# Patient Record
Sex: FEMALE | Race: WHITE | Employment: OTHER | URBAN - NONMETROPOLITAN AREA
[De-identification: names, ages, dates, MRNs, and addresses within clinical notes are randomized per-mention and may not be internally consistent; named-entity substitution may affect disease eponyms.]

---

## 2021-09-27 ENCOUNTER — APPOINTMENT (OUTPATIENT)
Dept: GENERAL RADIOLOGY | Age: 61
DRG: 177 | End: 2021-09-27
Payer: MEDICAID

## 2021-09-27 ENCOUNTER — HOSPITAL ENCOUNTER (INPATIENT)
Age: 61
LOS: 4 days | Discharge: NURSING, REHAB OR LONG-TERM CARE | DRG: 177 | End: 2021-10-01
Attending: EMERGENCY MEDICINE | Admitting: INTERNAL MEDICINE
Payer: MEDICAID

## 2021-09-27 ENCOUNTER — APPOINTMENT (OUTPATIENT)
Dept: CT IMAGING | Age: 61
DRG: 177 | End: 2021-09-27
Payer: MEDICAID

## 2021-09-27 DIAGNOSIS — J96.01 ACUTE HYPOXEMIC RESPIRATORY FAILURE DUE TO COVID-19 (HCC): Primary | ICD-10-CM

## 2021-09-27 DIAGNOSIS — F03.90 DEMENTIA WITHOUT BEHAVIORAL DISTURBANCE, UNSPECIFIED DEMENTIA TYPE: ICD-10-CM

## 2021-09-27 DIAGNOSIS — G89.29 OTHER CHRONIC PAIN: ICD-10-CM

## 2021-09-27 DIAGNOSIS — U07.1 ACUTE HYPOXEMIC RESPIRATORY FAILURE DUE TO COVID-19 (HCC): Primary | ICD-10-CM

## 2021-09-27 PROBLEM — E78.5 HYPERLIPIDEMIA: Status: ACTIVE | Noted: 2021-09-27

## 2021-09-27 PROBLEM — F31.9 BIPOLAR DISORDER (HCC): Status: ACTIVE | Noted: 2021-09-27

## 2021-09-27 PROBLEM — G35 HX OF MULTIPLE SCLEROSIS (HCC): Status: ACTIVE | Noted: 2021-09-27

## 2021-09-27 PROBLEM — J12.82 PNEUMONIA DUE TO COVID-19 VIRUS: Status: ACTIVE | Noted: 2021-09-27

## 2021-09-27 PROBLEM — F41.9 ANXIETY: Status: ACTIVE | Noted: 2021-09-27

## 2021-09-27 PROBLEM — J44.9 COPD (CHRONIC OBSTRUCTIVE PULMONARY DISEASE) (HCC): Status: ACTIVE | Noted: 2021-09-27

## 2021-09-27 LAB
ALBUMIN SERPL-MCNC: 2.5 G/DL (ref 3.5–5.2)
ALP BLD-CCNC: 78 U/L (ref 35–104)
ALT SERPL-CCNC: 8 U/L (ref 5–33)
ANION GAP SERPL CALCULATED.3IONS-SCNC: 9 MMOL/L (ref 7–19)
AST SERPL-CCNC: 22 U/L (ref 5–32)
BASE EXCESS ARTERIAL: 8.8 MMOL/L (ref -2–2)
BASOPHILS ABSOLUTE: 0.1 K/UL (ref 0–0.2)
BASOPHILS RELATIVE PERCENT: 1.1 % (ref 0–1)
BILIRUB SERPL-MCNC: 0.6 MG/DL (ref 0.2–1.2)
BUN BLDV-MCNC: 16 MG/DL (ref 8–23)
C-REACTIVE PROTEIN: 4.37 MG/DL (ref 0–0.5)
CALCIUM SERPL-MCNC: 8.8 MG/DL (ref 8.8–10.2)
CARBOXYHEMOGLOBIN ARTERIAL: 1.8 % (ref 0–5)
CHLORIDE BLD-SCNC: 105 MMOL/L (ref 98–111)
CO2: 30 MMOL/L (ref 22–29)
CREAT SERPL-MCNC: 0.4 MG/DL (ref 0.5–0.9)
D DIMER: 0.27 UG/ML FEU (ref 0–0.48)
EOSINOPHILS ABSOLUTE: 0 K/UL (ref 0–0.6)
EOSINOPHILS RELATIVE PERCENT: 0 % (ref 0–5)
GFR AFRICAN AMERICAN: >59
GFR NON-AFRICAN AMERICAN: >60
GLUCOSE BLD-MCNC: 98 MG/DL (ref 74–109)
HCO3 ARTERIAL: 35 MMOL/L (ref 22–26)
HCT VFR BLD CALC: 43 % (ref 37–47)
HEMOGLOBIN, ART, EXTENDED: 12.5 G/DL (ref 12–16)
HEMOGLOBIN: 13 G/DL (ref 12–16)
IMMATURE GRANULOCYTES #: 0.2 K/UL
LYMPHOCYTES ABSOLUTE: 2 K/UL (ref 1.1–4.5)
LYMPHOCYTES RELATIVE PERCENT: 38.3 % (ref 20–40)
MAGNESIUM: 1.9 MG/DL (ref 1.6–2.4)
MCH RBC QN AUTO: 33.9 PG (ref 27–31)
MCHC RBC AUTO-ENTMCNC: 30.2 G/DL (ref 33–37)
MCV RBC AUTO: 112.3 FL (ref 81–99)
METHEMOGLOBIN ARTERIAL: 1.4 %
MONOCYTES ABSOLUTE: 0.5 K/UL (ref 0–0.9)
MONOCYTES RELATIVE PERCENT: 10.1 % (ref 0–10)
NEUTROPHILS ABSOLUTE: 2.5 K/UL (ref 1.5–7.5)
NEUTROPHILS RELATIVE PERCENT: 46.7 % (ref 50–65)
O2 CONTENT ARTERIAL: 15.5 ML/DL
O2 SAT, ARTERIAL: 88.3 %
O2 THERAPY: ABNORMAL
PCO2 ARTERIAL: 54 MMHG (ref 35–45)
PDW BLD-RTO: 13.6 % (ref 11.5–14.5)
PH ARTERIAL: 7.42 (ref 7.35–7.45)
PLATELET # BLD: 195 K/UL (ref 130–400)
PMV BLD AUTO: 10.3 FL (ref 9.4–12.3)
PO2 ARTERIAL: 55 MMHG (ref 80–100)
POTASSIUM REFLEX MAGNESIUM: 3.4 MMOL/L (ref 3.5–5)
POTASSIUM, WHOLE BLOOD: 3.6
PROCALCITONIN: 0.09 NG/ML (ref 0–0.09)
RBC # BLD: 3.83 M/UL (ref 4.2–5.4)
SARS-COV-2, NAAT: DETECTED
SODIUM BLD-SCNC: 144 MMOL/L (ref 136–145)
TOTAL PROTEIN: 6.5 G/DL (ref 6.6–8.7)
WBC # BLD: 5.3 K/UL (ref 4.8–10.8)

## 2021-09-27 PROCEDURE — 80053 COMPREHEN METABOLIC PANEL: CPT

## 2021-09-27 PROCEDURE — 3E0333Z INTRODUCTION OF ANTI-INFLAMMATORY INTO PERIPHERAL VEIN, PERCUTANEOUS APPROACH: ICD-10-PCS | Performed by: STUDENT IN AN ORGANIZED HEALTH CARE EDUCATION/TRAINING PROGRAM

## 2021-09-27 PROCEDURE — 85025 COMPLETE CBC W/AUTO DIFF WBC: CPT

## 2021-09-27 PROCEDURE — 82803 BLOOD GASES ANY COMBINATION: CPT

## 2021-09-27 PROCEDURE — 93005 ELECTROCARDIOGRAM TRACING: CPT | Performed by: EMERGENCY MEDICINE

## 2021-09-27 PROCEDURE — 2700000000 HC OXYGEN THERAPY PER DAY

## 2021-09-27 PROCEDURE — 87635 SARS-COV-2 COVID-19 AMP PRB: CPT

## 2021-09-27 PROCEDURE — 85379 FIBRIN DEGRADATION QUANT: CPT

## 2021-09-27 PROCEDURE — 1210000000 HC MED SURG R&B

## 2021-09-27 PROCEDURE — 71045 X-RAY EXAM CHEST 1 VIEW: CPT

## 2021-09-27 PROCEDURE — 36600 WITHDRAWAL OF ARTERIAL BLOOD: CPT

## 2021-09-27 PROCEDURE — 84132 ASSAY OF SERUM POTASSIUM: CPT

## 2021-09-27 PROCEDURE — 36415 COLL VENOUS BLD VENIPUNCTURE: CPT

## 2021-09-27 PROCEDURE — 71275 CT ANGIOGRAPHY CHEST: CPT

## 2021-09-27 PROCEDURE — 6360000004 HC RX CONTRAST MEDICATION: Performed by: INTERNAL MEDICINE

## 2021-09-27 PROCEDURE — 84145 PROCALCITONIN (PCT): CPT

## 2021-09-27 PROCEDURE — 86140 C-REACTIVE PROTEIN: CPT

## 2021-09-27 PROCEDURE — 2580000003 HC RX 258: Performed by: INTERNAL MEDICINE

## 2021-09-27 PROCEDURE — 83735 ASSAY OF MAGNESIUM: CPT

## 2021-09-27 PROCEDURE — 6370000000 HC RX 637 (ALT 250 FOR IP): Performed by: INTERNAL MEDICINE

## 2021-09-27 PROCEDURE — 6360000002 HC RX W HCPCS: Performed by: INTERNAL MEDICINE

## 2021-09-27 PROCEDURE — 99284 EMERGENCY DEPT VISIT MOD MDM: CPT

## 2021-09-27 RX ORDER — METHYLPHENIDATE HYDROCHLORIDE 5 MG/1
10 TABLET ORAL 2 TIMES DAILY
Status: DISCONTINUED | OUTPATIENT
Start: 2021-09-27 | End: 2021-10-01 | Stop reason: HOSPADM

## 2021-09-27 RX ORDER — TRAZODONE HYDROCHLORIDE 50 MG/1
50 TABLET ORAL NIGHTLY
Status: DISCONTINUED | OUTPATIENT
Start: 2021-09-27 | End: 2021-10-01 | Stop reason: HOSPADM

## 2021-09-27 RX ORDER — LEVOTHYROXINE SODIUM 0.07 MG/1
75 TABLET ORAL DAILY
COMMUNITY

## 2021-09-27 RX ORDER — VITAMIN B COMPLEX
6000 TABLET ORAL DAILY
Status: DISCONTINUED | OUTPATIENT
Start: 2021-09-27 | End: 2021-10-01 | Stop reason: HOSPADM

## 2021-09-27 RX ORDER — MAGNESIUM SULFATE 1 G/100ML
1000 INJECTION INTRAVENOUS PRN
Status: DISCONTINUED | OUTPATIENT
Start: 2021-09-27 | End: 2021-10-01 | Stop reason: HOSPADM

## 2021-09-27 RX ORDER — TRAZODONE HYDROCHLORIDE 50 MG/1
50 TABLET ORAL NIGHTLY
COMMUNITY

## 2021-09-27 RX ORDER — 0.9 % SODIUM CHLORIDE 0.9 %
30 INTRAVENOUS SOLUTION INTRAVENOUS PRN
Status: DISCONTINUED | OUTPATIENT
Start: 2021-09-27 | End: 2021-10-01 | Stop reason: HOSPADM

## 2021-09-27 RX ORDER — ONDANSETRON 4 MG/1
4 TABLET, ORALLY DISINTEGRATING ORAL EVERY 8 HOURS PRN
Status: DISCONTINUED | OUTPATIENT
Start: 2021-09-27 | End: 2021-10-01 | Stop reason: HOSPADM

## 2021-09-27 RX ORDER — IBUPROFEN 600 MG/1
600 TABLET ORAL EVERY 12 HOURS PRN
COMMUNITY

## 2021-09-27 RX ORDER — MECOBALAMIN 5000 MCG
5 TABLET,DISINTEGRATING ORAL NIGHTLY
Status: DISCONTINUED | OUTPATIENT
Start: 2021-09-27 | End: 2021-10-01 | Stop reason: HOSPADM

## 2021-09-27 RX ORDER — POTASSIUM CHLORIDE 20 MEQ/1
40 TABLET, EXTENDED RELEASE ORAL PRN
Status: DISCONTINUED | OUTPATIENT
Start: 2021-09-27 | End: 2021-10-01 | Stop reason: HOSPADM

## 2021-09-27 RX ORDER — SODIUM CHLORIDE 0.9 % (FLUSH) 0.9 %
5-40 SYRINGE (ML) INJECTION EVERY 12 HOURS SCHEDULED
Status: DISCONTINUED | OUTPATIENT
Start: 2021-09-27 | End: 2021-10-01 | Stop reason: HOSPADM

## 2021-09-27 RX ORDER — SODIUM CHLORIDE 0.9 % (FLUSH) 0.9 %
5-40 SYRINGE (ML) INJECTION PRN
Status: DISCONTINUED | OUTPATIENT
Start: 2021-09-27 | End: 2021-10-01 | Stop reason: HOSPADM

## 2021-09-27 RX ORDER — BUDESONIDE AND FORMOTEROL FUMARATE DIHYDRATE 160; 4.5 UG/1; UG/1
2 AEROSOL RESPIRATORY (INHALATION) 2 TIMES DAILY
Status: DISCONTINUED | OUTPATIENT
Start: 2021-09-27 | End: 2021-10-01 | Stop reason: HOSPADM

## 2021-09-27 RX ORDER — ASCORBIC ACID 500 MG
500 TABLET ORAL DAILY
Status: DISCONTINUED | OUTPATIENT
Start: 2021-09-27 | End: 2021-09-29

## 2021-09-27 RX ORDER — FLUOXETINE 10 MG/1
10 CAPSULE ORAL DAILY
Status: DISCONTINUED | OUTPATIENT
Start: 2021-09-27 | End: 2021-10-01 | Stop reason: HOSPADM

## 2021-09-27 RX ORDER — CALCIUM CARBONATE 200(500)MG
1 TABLET,CHEWABLE ORAL 3 TIMES DAILY PRN
COMMUNITY

## 2021-09-27 RX ORDER — VITAMIN B COMPLEX
2000 TABLET ORAL DAILY
Status: DISCONTINUED | OUTPATIENT
Start: 2021-10-04 | End: 2021-10-01 | Stop reason: HOSPADM

## 2021-09-27 RX ORDER — LEVOTHYROXINE SODIUM 0.07 MG/1
75 TABLET ORAL DAILY
Status: DISCONTINUED | OUTPATIENT
Start: 2021-09-27 | End: 2021-10-01 | Stop reason: HOSPADM

## 2021-09-27 RX ORDER — ACETAMINOPHEN 325 MG/1
650 TABLET ORAL EVERY 6 HOURS PRN
Status: DISCONTINUED | OUTPATIENT
Start: 2021-09-27 | End: 2021-10-01 | Stop reason: HOSPADM

## 2021-09-27 RX ORDER — HYDROCODONE BITARTRATE AND ACETAMINOPHEN 7.5; 325 MG/1; MG/1
1 TABLET ORAL EVERY 6 HOURS PRN
Status: ON HOLD | COMMUNITY
End: 2021-10-01 | Stop reason: SDUPTHER

## 2021-09-27 RX ORDER — BUSPIRONE HYDROCHLORIDE 5 MG/1
5 TABLET ORAL 2 TIMES DAILY
COMMUNITY

## 2021-09-27 RX ORDER — ACETAMINOPHEN 650 MG/1
650 SUPPOSITORY RECTAL EVERY 6 HOURS PRN
Status: DISCONTINUED | OUTPATIENT
Start: 2021-09-27 | End: 2021-10-01 | Stop reason: HOSPADM

## 2021-09-27 RX ORDER — GLATIRAMER ACETATE 20 MG/ML
20 INJECTION, SOLUTION SUBCUTANEOUS DAILY
COMMUNITY

## 2021-09-27 RX ORDER — CYCLOBENZAPRINE HCL 10 MG
10 TABLET ORAL 3 TIMES DAILY PRN
COMMUNITY

## 2021-09-27 RX ORDER — ZINC SULFATE 50(220)MG
50 CAPSULE ORAL DAILY
Status: DISCONTINUED | OUTPATIENT
Start: 2021-09-27 | End: 2021-10-01 | Stop reason: HOSPADM

## 2021-09-27 RX ORDER — DEXAMETHASONE SODIUM PHOSPHATE 10 MG/ML
6 INJECTION, SOLUTION INTRAMUSCULAR; INTRAVENOUS EVERY 24 HOURS
Status: DISCONTINUED | OUTPATIENT
Start: 2021-09-27 | End: 2021-09-29

## 2021-09-27 RX ORDER — DIVALPROEX SODIUM 250 MG/1
250 TABLET, DELAYED RELEASE ORAL DAILY
Status: ON HOLD | COMMUNITY
End: 2021-10-01 | Stop reason: SDUPTHER

## 2021-09-27 RX ORDER — ONDANSETRON 2 MG/ML
4 INJECTION INTRAMUSCULAR; INTRAVENOUS EVERY 6 HOURS PRN
Status: DISCONTINUED | OUTPATIENT
Start: 2021-09-27 | End: 2021-10-01 | Stop reason: HOSPADM

## 2021-09-27 RX ORDER — MENTHOL AND ZINC OXIDE .44; 20.625 G/100G; G/100G
OINTMENT TOPICAL DAILY
COMMUNITY
End: 2021-10-04

## 2021-09-27 RX ORDER — GLATIRAMER ACETATE 20 MG/ML
20 INJECTION, SOLUTION SUBCUTANEOUS DAILY
Status: DISCONTINUED | OUTPATIENT
Start: 2021-09-28 | End: 2021-10-01 | Stop reason: HOSPADM

## 2021-09-27 RX ORDER — FAMOTIDINE 20 MG/1
20 TABLET, FILM COATED ORAL DAILY
COMMUNITY

## 2021-09-27 RX ORDER — DIPHENHYDRAMINE HCL 25 MG
25 CAPSULE ORAL EVERY 6 HOURS PRN
COMMUNITY

## 2021-09-27 RX ORDER — DOCUSATE SODIUM 100 MG/1
100 CAPSULE, LIQUID FILLED ORAL DAILY
COMMUNITY

## 2021-09-27 RX ORDER — DIPHENHYDRAMINE HCL 25 MG
25 TABLET ORAL EVERY 6 HOURS PRN
Status: DISCONTINUED | OUTPATIENT
Start: 2021-09-27 | End: 2021-10-01 | Stop reason: HOSPADM

## 2021-09-27 RX ORDER — CYCLOBENZAPRINE HCL 10 MG
10 TABLET ORAL 3 TIMES DAILY PRN
Status: DISCONTINUED | OUTPATIENT
Start: 2021-09-27 | End: 2021-10-01 | Stop reason: HOSPADM

## 2021-09-27 RX ORDER — GABAPENTIN 300 MG/1
300 CAPSULE ORAL 3 TIMES DAILY
Status: DISCONTINUED | OUTPATIENT
Start: 2021-09-27 | End: 2021-10-01 | Stop reason: HOSPADM

## 2021-09-27 RX ORDER — FLUOXETINE 10 MG/1
40 CAPSULE ORAL DAILY
Status: ON HOLD | COMMUNITY
End: 2021-10-01 | Stop reason: SDUPTHER

## 2021-09-27 RX ORDER — SODIUM CHLORIDE 9 MG/ML
25 INJECTION, SOLUTION INTRAVENOUS PRN
Status: DISCONTINUED | OUTPATIENT
Start: 2021-09-27 | End: 2021-10-01 | Stop reason: HOSPADM

## 2021-09-27 RX ORDER — OYSTER SHELL CALCIUM WITH VITAMIN D 500; 200 MG/1; [IU]/1
1 TABLET, FILM COATED ORAL 2 TIMES DAILY
COMMUNITY

## 2021-09-27 RX ORDER — POLYETHYLENE GLYCOL 3350 17 G/17G
17 POWDER, FOR SOLUTION ORAL DAILY PRN
Status: DISCONTINUED | OUTPATIENT
Start: 2021-09-27 | End: 2021-10-01 | Stop reason: HOSPADM

## 2021-09-27 RX ORDER — GUAIFENESIN/DEXTROMETHORPHAN 100-10MG/5
5 SYRUP ORAL EVERY 4 HOURS PRN
Status: DISCONTINUED | OUTPATIENT
Start: 2021-09-27 | End: 2021-10-01 | Stop reason: HOSPADM

## 2021-09-27 RX ORDER — RISPERIDONE 0.5 MG/1
0.5 TABLET, FILM COATED ORAL 2 TIMES DAILY
Status: DISCONTINUED | OUTPATIENT
Start: 2021-09-27 | End: 2021-10-01 | Stop reason: HOSPADM

## 2021-09-27 RX ORDER — METHYLPHENIDATE HYDROCHLORIDE 10 MG/1
10 TABLET ORAL 2 TIMES DAILY
Status: ON HOLD | COMMUNITY
End: 2021-10-10 | Stop reason: SDUPTHER

## 2021-09-27 RX ORDER — DIVALPROEX SODIUM 500 MG/1
250 TABLET, DELAYED RELEASE ORAL 2 TIMES DAILY
Status: ON HOLD | COMMUNITY
End: 2021-09-27

## 2021-09-27 RX ORDER — FENOFIBRATE 145 MG/1
145 TABLET, COATED ORAL DAILY
COMMUNITY

## 2021-09-27 RX ORDER — DIVALPROEX SODIUM 500 MG/1
500 TABLET, DELAYED RELEASE ORAL 3 TIMES DAILY
Status: DISCONTINUED | OUTPATIENT
Start: 2021-09-27 | End: 2021-10-01 | Stop reason: HOSPADM

## 2021-09-27 RX ORDER — NICOTINE POLACRILEX 2 MG
GUM BUCCAL
COMMUNITY

## 2021-09-27 RX ORDER — RISPERIDONE 0.5 MG/1
0.25 TABLET, FILM COATED ORAL DAILY
COMMUNITY
End: 2021-10-04

## 2021-09-27 RX ORDER — FAMOTIDINE 20 MG/1
20 TABLET, FILM COATED ORAL 2 TIMES DAILY
Status: DISCONTINUED | OUTPATIENT
Start: 2021-09-27 | End: 2021-09-29

## 2021-09-27 RX ORDER — BUSPIRONE HYDROCHLORIDE 5 MG/1
5 TABLET ORAL 3 TIMES DAILY
Status: DISCONTINUED | OUTPATIENT
Start: 2021-09-27 | End: 2021-10-01 | Stop reason: HOSPADM

## 2021-09-27 RX ORDER — POTASSIUM CHLORIDE 7.45 MG/ML
10 INJECTION INTRAVENOUS PRN
Status: DISCONTINUED | OUTPATIENT
Start: 2021-09-27 | End: 2021-10-01 | Stop reason: HOSPADM

## 2021-09-27 RX ORDER — HYDROCODONE BITARTRATE AND ACETAMINOPHEN 7.5; 325 MG/1; MG/1
1 TABLET ORAL EVERY 6 HOURS PRN
Status: DISCONTINUED | OUTPATIENT
Start: 2021-09-27 | End: 2021-10-01 | Stop reason: HOSPADM

## 2021-09-27 RX ORDER — ALBUTEROL SULFATE 90 UG/1
2 AEROSOL, METERED RESPIRATORY (INHALATION) EVERY 4 HOURS PRN
Status: DISCONTINUED | OUTPATIENT
Start: 2021-09-27 | End: 2021-09-29

## 2021-09-27 RX ORDER — FENOFIBRATE 160 MG/1
160 TABLET ORAL DAILY
Status: DISCONTINUED | OUTPATIENT
Start: 2021-09-27 | End: 2021-10-01 | Stop reason: HOSPADM

## 2021-09-27 RX ORDER — GABAPENTIN 300 MG/1
300 CAPSULE ORAL 3 TIMES DAILY
Status: ON HOLD | COMMUNITY
End: 2021-10-10 | Stop reason: SDUPTHER

## 2021-09-27 RX ADMIN — GABAPENTIN 300 MG: 300 CAPSULE ORAL at 16:22

## 2021-09-27 RX ADMIN — BUSPIRONE HYDROCHLORIDE 5 MG: 5 TABLET ORAL at 21:14

## 2021-09-27 RX ADMIN — ENOXAPARIN SODIUM 30 MG: 30 INJECTION SUBCUTANEOUS at 21:14

## 2021-09-27 RX ADMIN — Medication 5 MG: at 21:14

## 2021-09-27 RX ADMIN — IOPAMIDOL 90 ML: 755 INJECTION, SOLUTION INTRAVENOUS at 16:08

## 2021-09-27 RX ADMIN — FLUOXETINE HYDROCHLORIDE 10 MG: 10 CAPSULE ORAL at 16:23

## 2021-09-27 RX ADMIN — METHYLPHENIDATE HYDROCHLORIDE 10 MG: 5 TABLET ORAL at 16:22

## 2021-09-27 RX ADMIN — DIVALPROEX SODIUM 500 MG: 500 TABLET, DELAYED RELEASE ORAL at 16:23

## 2021-09-27 RX ADMIN — RISPERIDONE 0.5 MG: 0.5 TABLET ORAL at 21:14

## 2021-09-27 RX ADMIN — RISPERIDONE 0.5 MG: 0.5 TABLET ORAL at 16:23

## 2021-09-27 RX ADMIN — TRAZODONE HYDROCHLORIDE 50 MG: 50 TABLET ORAL at 21:14

## 2021-09-27 RX ADMIN — LEVOTHYROXINE SODIUM 75 MCG: 75 TABLET ORAL at 16:23

## 2021-09-27 RX ADMIN — GABAPENTIN 300 MG: 300 CAPSULE ORAL at 21:14

## 2021-09-27 RX ADMIN — SODIUM CHLORIDE, PRESERVATIVE FREE 10 ML: 5 INJECTION INTRAVENOUS at 21:14

## 2021-09-27 RX ADMIN — DEXAMETHASONE SODIUM PHOSPHATE 6 MG: 10 INJECTION, SOLUTION INTRAMUSCULAR; INTRAVENOUS at 16:22

## 2021-09-27 RX ADMIN — FAMOTIDINE 20 MG: 20 TABLET, FILM COATED ORAL at 21:14

## 2021-09-27 RX ADMIN — DIVALPROEX SODIUM 500 MG: 500 TABLET, DELAYED RELEASE ORAL at 21:14

## 2021-09-27 RX ADMIN — FENOFIBRATE 160 MG: 160 TABLET ORAL at 16:23

## 2021-09-27 RX ADMIN — OXYCODONE HYDROCHLORIDE AND ACETAMINOPHEN 500 MG: 500 TABLET ORAL at 16:23

## 2021-09-27 RX ADMIN — ZINC SULFATE 220 MG (50 MG) CAPSULE 50 MG: CAPSULE at 16:22

## 2021-09-27 RX ADMIN — FAMOTIDINE 20 MG: 20 TABLET, FILM COATED ORAL at 16:23

## 2021-09-27 RX ADMIN — BUSPIRONE HYDROCHLORIDE 5 MG: 5 TABLET ORAL at 16:23

## 2021-09-27 RX ADMIN — Medication 6000 UNITS: at 16:23

## 2021-09-27 ASSESSMENT — ENCOUNTER SYMPTOMS
COUGH: 1
SHORTNESS OF BREATH: 1

## 2021-09-27 NOTE — ED NOTES
Non rebreather taken off of pt upon arrival. RA sat 80. md aware.      Nancy Woods, RN  09/27/21 6189

## 2021-09-27 NOTE — ED NOTES
Isolation precautions initiated. Isolation gown, mask, face shield, double gloves, bonnet and foot coverings worn by staff entering room. Patient placed in mask and instructed to wear mask during all contact. Patient placed on cardiac monitor, continuous pulse oximeter, and NIBP monitor. Monitor alarms on.     Patient placed in a gown       Nithin Elder RN  09/27/21 9070

## 2021-09-27 NOTE — ED NOTES
md aware of pt's RA sat.      Pt placed on 6 L NC. 02 sat now [address] [place] [address], RN  09/27/21 4041

## 2021-09-27 NOTE — PROGRESS NOTES
Blood Gas, Arterial [[id number]] (Abnormal) Collected: 09/27/21 1230     Specimen: Blood gases Updated: 09/27/21 1231      pH, Arterial 7.420      pCO2, Arterial 54.0 mmHg       pO2, Arterial 55.0 mmHg       HCO3, Arterial 35.0 mmol/L       Base Excess, Arterial 8.8 mmol/L       Hemoglobin, Art, Extended 12.5 g/dL       O2 Sat, Arterial 88.3 %       Carboxyhgb, Arterial 1.8 %       Methemoglobin, Arterial 1.4 %       O2 Content, Arterial 15.5 mL/dL       O2 Therapy Unknown     Potassium, Whole Blood [[id number]] Collected: 09/27/21 1230      Updated: 09/27/21 1231      Potassium, Whole Blood 3.6     AT+, R RAd, RR = 20 , O2 @ 4 lpm NC

## 2021-09-27 NOTE — H&P
[address] - History & Physical    [id number]  PCP: Viktor Wing  Date of Admission: 9/27/2021   Date of Service: Pt seen/examined on9/27/2021 and Admitted to Inpatient with expected LOS greater than two midnights due to medical therapy. Chief Complaint: Hypoxemia/desaturation    History Of Present Illness: The patient is a 59 y.o. female with a past medical history of multiple sclerosis, bipolar disorder, COPD, anxiety who presented to the ED from skilled nursing facility after she was noted to have desaturations. Patient is pleasantly confused at baseline (AAO x1, person) and currently has no complaints. She denies chest pain, dyspnea, nausea, vomiting, diarrhea, constipation, fevers, chills, sweats, dysuria. She was found to have COVID-19 with associated acute hypoxic respiratory failure and pneumonia. Past Medical History:    No past medical history on file. Past Surgical History:    No past surgical history on file. Home Medications:  Prior to Admission medications    Medication Sig Start Date End Date Taking?  Authorizing Provider   diphenhydrAMINE (BENADRYL) 25 MG capsule Take 25 mg by mouth every 6 hours as needed for Itching   Yes Historical Provider, MD   Biotin 1 MG CAPS Take by mouth   Yes Historical Provider, MD   busPIRone (BUSPAR) 5 MG tablet Take 5 mg by mouth 2 times daily    Yes Historical Provider, MD   glatiramer (COPAXONE) 20 MG/ML injection Inject into the skin daily   Yes Historical Provider, MD   cyclobenzaprine (FLEXERIL) 10 MG tablet Take 10 mg by mouth 3 times daily as needed for Muscle spasms   Yes Historical Provider, MD   famotidine (PEPCID) 20 MG tablet Take 20 mg by mouth daily    Yes Historical Provider, MD   fenofibrate (TRICOR) 145 MG tablet Take 145 mg by mouth daily   Yes Historical Provider, MD   FLUoxetine (PROZAC) 10 MG capsule Take 40 mg by mouth daily    Yes Historical Provider, MD   gabapentin (NEURONTIN) 300 MG capsule Take 300 mg by mouth 3 times daily. Yes Historical Provider, MD   HYDROcodone-acetaminophen (NORCO) 7.5-325 MG per tablet Take 1 tablet by mouth every 6 hours as needed for Pain. Yes Historical Provider, MD   levothyroxine (SYNTHROID) 75 MCG tablet Take 75 mcg by mouth Daily   Yes Historical Provider, MD   methylphenidate (RITALIN) 10 MG tablet Take 10 mg by mouth 2 times daily. Yes Historical Provider, MD   risperiDONE (RISPERDAL) 0.5 MG tablet Take 0.25 mg by mouth daily    Yes Historical Provider, MD   traZODone (DESYREL) 50 MG tablet Take 50 mg by mouth nightly   Yes Historical Provider, MD   camphor-menthol (SARNA) 0.5-0.5 % lotion Apply topically as needed for Itching Apply topically as needed. Yes Historical Provider, MD   calcium carbonate (TUMS) 500 MG chewable tablet Take 1 tablet by mouth 3 times daily   Yes Historical Provider, MD   calcium-vitamin D (OSCAL-500) 500-200 MG-UNIT per tablet Take 1 tablet by mouth 2 times daily Calcium carbonate-vitamin D3 600mg-400unit   Yes Historical Provider, MD   menthol-zinc oxide (CALMOSEPTINE) 0.44-20.625 % OINT ointment Apply topically daily Max 30 ml per day. Yes Historical Provider, MD   divalproex (DEPAKOTE) 250 MG DR tablet Take 250 mg by mouth daily   Yes Historical Provider, MD   docusate sodium (COLACE) 100 MG capsule Take 100 mg by mouth daily   Yes Historical Provider, MD   ibuprofen (ADVIL;MOTRIN) 600 MG tablet Take 600 mg by mouth every 12 hours as needed for Pain   Yes Historical Provider, MD   cadexomer iodine (IODOSORB) 0.9 % gel Apply topically every 48 hours Apply topically daily as needed. Yes Historical Provider, MD       Allergies:    Patient has no known allergies. Social History:      Tobacco:   has no history on file for tobacco use. Alcohol:   has no history on file for alcohol use. Illicit Drugs: denies    Family History:  No family history on file.     Review of Systems:   Negative except as above        Physical Examination:  BP (!) 112/56   Pulse 96   Temp 97.5 °F (36.4 °C) (Oral)   Resp 20   Wt 135 lb (61.2 kg)   SpO2 91%     General appearance: Alert, pleasantly confused  HEENT: AT/NC  Lungs: BLAE  Heart: RRR  Abdomen: BS+  Extremities: pulses+, contractures  Neurologic: Alert, weak  Skin: warm         Diagnostic Data:     CBC:  Recent Labs     09/27/21  1115   WBC 5.3   HGB 13.0   HCT 43.0        BMP:  Recent Labs     09/27/21  1115 09/27/21  1230     --    K 3.4* 3.6     --    CO2 30*  --    BUN 16  --    CREATININE 0.4*  --    CALCIUM 8.8  --      Recent Labs     09/27/21  1115   AST 22   ALT 8   BILITOT 0.6   ALKPHOS 78     Coag Panel: No results for input(s): INR, PROTIME, APTT in the last 72 hours. Cardiac Enzymes: No results for input(s): Adonica Hoose in the last 72 hours. ABGs:  Lab Results   Component Value Date    PHART 7.420 09/27/2021    PO2ART 55.0 09/27/2021    NSX1RHU 54.0 09/27/2021     Urinalysis:  Lab Results   Component Value Date    NITRU NEGATIVE 05/26/2015    WBCUA 7 05/26/2015    BACTERIA NEGATIVE 05/26/2015    RBCUA 3 05/26/2015    GLUCOSEU NEGATIVE 05/26/2015     RAD:     XR CHEST PORTABLE [[id number]] Resulted: 09/27/21 1152      Order Status: Completed Updated: 09/27/21 1154     Narrative:       EXAMINATION: Chest one view 9/27/2021   HISTORY: Covid positive. Shortness of breath. FINDINGS: Today's exam is compared to previous study of 5/26/2015. There is scoliosis of the thoracolumbar spine. There is basilar   atelectasis. The disease within the left lower lobe including the   lingular segment of the left upper lobe is more confluent worrisome   for pneumonia. No effusion or free air is demonstrated.     Impression:       1. . Expiratory chest with basilar atelectasis. There is more confluent   disease within the left lower lobe as well as lingular segment of the   left upper lobe suspicious for pneumonia.    2. Scoliosis of the thoracolumbar spine which is moderate to severe   with a rotatory component. Signed by Dr Debbie Means Problems    Diagnosis Date Noted    Pneumonia due to COVID-19 virus [U07.1, J12.82] 09/27/2021    Acute respiratory failure with hypoxia ([facility] [J96.01] 09/27/2021    COVID-19 [U07.1] 09/27/2021    Hx of multiple sclerosis ([facility] Wojciech Golden 09/27/2021    Bipolar disorder ([facility] [F31.9] 09/27/2021    COPD (chronic obstructive pulmonary disease) ([facility] [J44.9] 09/27/2021    Hyperlipidemia [E78.5] 09/27/2021    Anxiety [F41.9] 09/27/2021       MPRESSION / PLAN:  Principal Problem:    Pneumonia due to COVID-19 virus  Active Problems:    Acute respiratory failure with hypoxia (HCC)    COVID-19    Hx of multiple sclerosis (HCC)    Bipolar disorder (HCC)    COPD (chronic obstructive pulmonary disease) (HCC)    Hyperlipidemia    Anxiety  Resolved Problems:    * No resolved hospital problems. *    COVID-19: Bronchodilators. Decadron. Remdesivir. Does not meet criteria for baricitinib/tocilizumab. Vitamin C/D/Zinc/Melatonin. Monitor O2 saturations. Hypothyroidism: Synthroid. HLD: Statin    Supportive management. DVT ppx: Lovenox. Full code.     Chloe Fox MD  9/27/2021

## 2021-09-27 NOTE — ED PROVIDER NOTES
[facility] EMERGENCY DEPT  eMERGENCY dEPARTMENT eNCOUnter      Pt Name: Nikia Shepherd  MRN: [id number]  [place] 1960  Date of evaluation: 9/27/2021  Provider: Ashlee Gore MD    [address] [place] [address]       Chief Complaint   Patient presents with    Positive For Covid-19    Shortness of Breath         HISTORY OF PRESENT ILLNESS   (Location/Symptom, Timing/Onset,Context/Setting, Quality, Duration, Modifying Factors, Severity)  Note limiting factors. Nikia Shepherd is a 59 y.o. female who presents to the emergency department Covid positive with shortness of breath. 64-year-old female resident of [facility]. Had tested positive for Covid. Oxygen level was low nursing home staff contacted EMS report to us they could not get her sat above 80s even with oxygen. She came in with a nonrebreather on 100%. We switch her over to nasal cannula. On 6 L she has been satting 100% I turned it down to 4 and ordered ABGs. The patient is pleasantly demented in no apparent distress    The history is provided by the patient and the nursing home. NursingNotes were reviewed. REVIEW OF SYSTEMS    (2-9 systems for level 4, 10 or more for level 5)     Review of Systems   Unable to perform ROS: Dementia (History from the nursing home facility)   Respiratory: Positive for cough and shortness of breath. A complete review of systems was performed and is negative except as noted above in the HPI. PAST MEDICAL HISTORY   No past medical history on file. SURGICAL HISTORY     No past surgical history on file.       CURRENT MEDICATIONS       Previous Medications    BIOTIN 1 MG CAPS    Take by mouth    BUSPIRONE (BUSPAR) 5 MG TABLET    Take 5 mg by mouth 3 times daily    CYCLOBENZAPRINE (FLEXERIL) 10 MG TABLET    Take 10 mg by mouth 3 times daily as needed for Muscle spasms    DIPHENHYDRAMINE (BENADRYL) 25 MG CAPSULE    Take 25 mg by mouth every 6 hours as needed for Itching    DIVALPROEX (DEPAKOTE) 500 MG DR TABLET    Take 500 mg by mouth 3 times daily    FAMOTIDINE (PEPCID) 20 MG TABLET    Take 20 mg by mouth 2 times daily    FENOFIBRATE (TRICOR) 145 MG TABLET    Take 145 mg by mouth daily    FLUOXETINE (PROZAC) 10 MG CAPSULE    Take 10 mg by mouth daily    GABAPENTIN (NEURONTIN) 300 MG CAPSULE    Take 300 mg by mouth 3 times daily. GLATIRAMER (COPAXONE) 20 MG/ML INJECTION    Inject into the skin daily    HYDROCODONE-ACETAMINOPHEN (NORCO) 7.5-325 MG PER TABLET    Take 1 tablet by mouth every 6 hours as needed for Pain. LEVOTHYROXINE (SYNTHROID) 75 MCG TABLET    Take 75 mcg by mouth Daily    METHYLPHENIDATE (RITALIN) 10 MG TABLET    Take 10 mg by mouth 2 times daily. RISPERIDONE (RISPERDAL) 0.5 MG TABLET    Take 0.5 mg by mouth 2 times daily    TRAZODONE (DESYREL) 50 MG TABLET    Take 50 mg by mouth nightly       ALLERGIES     Patient has no known allergies. FAMILY HISTORY     No family history on file. SOCIAL HISTORY       Social History     Socioeconomic History    Marital status: Single     Spouse name: Not on file    Number of children: Not on file    Years of education: Not on file    Highest education level: Not on file   Occupational History    Not on file   Tobacco Use    Smoking status: Not on file   Substance and Sexual Activity    Alcohol use: Not on file    Drug use: Not on file    Sexual activity: Not on file   Other Topics Concern    Not on file   Social History Narrative    Not on file     Social Determinants of Health     Financial Resource Strain:     Difficulty of Paying Living Expenses:    Food Insecurity:     Worried About Running Out of Food in the Last Year:     [address] in the Last Year:    Transportation Needs:     Lack of Transportation (Medical):      Lack of Transportation (Non-Medical):    Physical Activity:     Days of Exercise per Week:     Minutes of Exercise per Session:    Stress:     Feeling of Stress :    Social Connections:     Frequency of Communication with Friends and Family:     Frequency of Social Gatherings with Friends and Family:     Attends Rastafarian Services:     Active Member of Clubs or Organizations:     Attends Club or Organization Meetings:     Marital Status:    Intimate Partner Violence:     Fear of Current or Ex-Partner:     Emotionally Abused:     Physically Abused:     Sexually Abused:        SCREENINGS             PHYSICAL EXAM    (up to 7 for level 4, 8 or more for level 5)     ED Triage Vitals [09/27/21 1111]   BP Temp Temp Source Pulse Resp SpO2 Height Weight   106/76 99.9 °F (37.7 °C) Temporal 105 20 98 % -- 135 lb (61.2 kg)       Physical Exam  Vitals and nursing note reviewed. Constitutional:       Appearance: She is well-developed. Comments: She appears older than her stated age   HENT:      Head: Normocephalic and atraumatic. Right Ear: External ear normal.      Left Ear: External ear normal.      Mouth/Throat:      Pharynx: Oropharynx is clear. Eyes:      Pupils: Pupils are equal, round, and reactive to light. Cardiovascular:      Rate and Rhythm: Normal rate and regular rhythm. Heart sounds: Normal heart sounds. Pulmonary:      Effort: Pulmonary effort is normal.      Breath sounds: Rales present. Abdominal:      General: Bowel sounds are normal.      Palpations: Abdomen is soft. Musculoskeletal:         General: Normal range of motion. Cervical back: Normal range of motion and neck supple. Skin:     General: Skin is warm and dry. Coloration: Skin is not jaundiced or pale. Neurological:      Mental Status: She is alert. Mental status is at baseline. Comments: Patient is oriented to self. Appears to have contractures on the left side. Psychiatric:         Cognition and Memory: Cognition is impaired.       Comments: Pleasant and confused         DIAGNOSTIC RESULTS     EKG: All EKG's are interpreted by the Emergency Department Physician who either signs or Co-signs this chart in the absence of a cardiologist.    Sinus tachycardia rate 1 1 no ST elevation. Normal electrical indices. RADIOLOGY:   Non-plain film images such as CT, Ultrasound and MRI are read by the radiologist. Plainradiographic images are visualized and preliminarily interpreted by the emergency physician with the below findings:    I have reviewed the results. Interpretation per the Radiologist below, if available at the time of this note:    XR CHEST PORTABLE   Final Result   1.. Expiratory chest with basilar atelectasis. There is more confluent   disease within the left lower lobe as well as lingular segment of the   left upper lobe suspicious for pneumonia. 2. Scoliosis of the thoracolumbar spine which is moderate to severe   with a rotatory component.    Signed by Dr Ede Harvey            ED BEDSIDE ULTRASOUND:   Performed by ED Physician - none    LABS:  Labs Reviewed   COVID-19, RAPID - Abnormal; Notable for the following components:       Result Value    SARS-CoV-2, NAAT DETECTED (*)     All other components within normal limits    Narrative:     Nii Gayle tel. ,  Results called to [facility] ER RN, 09/27/2021 11:58, by [facility]   CBC WITH AUTO DIFFERENTIAL - Abnormal; Notable for the following components:    RBC 3.83 (*)     .3 (*)     MCH 33.9 (*)     MCHC 30.2 (*)     Neutrophils % 46.7 (*)     Monocytes % 10.1 (*)     Basophils % 1.1 (*)     All other components within normal limits   COMPREHENSIVE METABOLIC PANEL W/ REFLEX TO MG FOR LOW K - Abnormal; Notable for the following components:    Potassium reflex Magnesium 3.4 (*)     CO2 30 (*)     CREATININE 0.4 (*)     Total Protein 6.5 (*)     Albumin 2.5 (*)     All other components within normal limits   BLOOD GAS, ARTERIAL - Abnormal; Notable for the following components:    pCO2, Arterial 54.0 (*)     pO2, Arterial 55.0 (*)     HCO3, Arterial 35.0 (*)     Base Excess, Arterial 8.8 (*)     O2 Sat, Arterial 88.3 (*)     All other components within normal limits   MAGNESIUM   POTASSIUM, WHOLE BLOOD       All other labs were within normal range or not returned as of this dictation. EMERGENCY DEPARTMENT COURSE and DIFFERENTIALDIAGNOSIS/MDM:   Vitals:    Vitals:    09/27/21 1111 09/27/21 1122 09/27/21 1130   BP: 106/76     Pulse: 105     Resp: 20     Temp: 99.9 °F (37.7 °C)     TempSrc: Temporal     SpO2: 98% (!) 78% 92%   Weight: 135 lb (61.2 kg)         MDM  Number of Diagnoses or Management Options  Acute hypoxemic respiratory failure due to COVID-19 [facility])  Dementia without behavioral disturbance, unspecified dementia type [facility])  Diagnosis management comments: Patient diagnosed with Covid confirmed here. Hypoxia. Arrived with nonrebreather mask at 100% sat. Converted to 6 L was satting 98 to 100%. I turned her down to 4 and then obtained ABGs which showed her to be hypoxic. Turned her O2 back up. At rest she sats better any activity or movement she desats quickly. I will discussed the case with the hospitalist service. CONSULTS:  IP CONSULT TO HOSPITALIST    PROCEDURES:  Unless otherwise notedbelow, none     Procedures    FINAL IMPRESSION     1. Acute hypoxemic respiratory failure due to COVID-19 ([facility] [address]    2.  Dementia without behavioral disturbance, unspecified dementia type ([facility]          DISPOSITION/PLAN   DISPOSITION Admitted 09/27/2021 01:00:13 PM      PATIENT REFERRED TO:  @FUP@    DISCHARGE MEDICATIONS:  New Prescriptions    No medications on file          (Please note that portions of this note were completed with a voice recognition program.  Efforts were made to edit the dictations butoccasionally words are mis-transcribed.)    Aurelia Zamora MD (electronically signed)  AttendingEmergency Physician          Tanvir Corral MD  09/27/21 1300

## 2021-09-27 NOTE — PROGRESS NOTES
Pharmacy Consult      Marysol Mckenzie is a 59 y.o. female for whom pharmacy has been consulted to dose baricitinib. Patient Active Problem List   Diagnosis    Pneumonia due to COVID-19 virus    Acute respiratory failure with hypoxia (HCC)       Allergies:  Patient has no known allergies. Ht/Wt:   Ht Readings from Last 1 Encounters:   No data found for Ht        Wt Readings from Last 1 Encounters:   09/27/21 135 lb (61.2 kg)         Recent Labs     09/27/21  1115   LABGLOM >60   GFRAA >59   LYMPHSABS 2.0   NEUTROABS 2.5   ALT 8   AST 22           Assessment/Plan:  Patient does not meet criteria for baricitinib, CRP 4.37, D dimer 0.27, 3 L nasal cannula. Padmini Sun of this and told him to reconsult us if oxygen rapidly increase or inflammatory markers change. If onset < 7 days she does meet Remdesivir use. Thank you for the consult.      Electronically signed by NIVIA Dugan [facility] on 9/27/2021 at 3:02 PM

## 2021-09-27 NOTE — CARE COORDINATION
Pt resides at [facility].    [place] [id number] [address]  Electronically signed by Antonio Seay on 9/27/2021 at 1:50 PM

## 2021-09-28 PROBLEM — Z51.5 PALLIATIVE CARE PATIENT: Status: ACTIVE | Noted: 2021-09-28

## 2021-09-28 LAB
ALBUMIN SERPL-MCNC: 2.4 G/DL (ref 3.5–5.2)
ALP BLD-CCNC: 77 U/L (ref 35–104)
ALT SERPL-CCNC: 7 U/L (ref 5–33)
ANION GAP SERPL CALCULATED.3IONS-SCNC: 5 MMOL/L (ref 7–19)
APTT: 44 SEC (ref 26–36.2)
AST SERPL-CCNC: 20 U/L (ref 5–32)
BASOPHILS ABSOLUTE: 0.1 K/UL (ref 0–0.2)
BASOPHILS RELATIVE PERCENT: 1.8 % (ref 0–1)
BILIRUB SERPL-MCNC: 0.5 MG/DL (ref 0.2–1.2)
BUN BLDV-MCNC: 18 MG/DL (ref 8–23)
C-REACTIVE PROTEIN: 4.45 MG/DL (ref 0–0.5)
CALCIUM SERPL-MCNC: 9.3 MG/DL (ref 8.8–10.2)
CHLORIDE BLD-SCNC: 104 MMOL/L (ref 98–111)
CO2: 33 MMOL/L (ref 22–29)
CREAT SERPL-MCNC: 0.4 MG/DL (ref 0.5–0.9)
D DIMER: <0.27 UG/ML FEU (ref 0–0.48)
EKG P AXIS: 75 DEGREES
EKG P-R INTERVAL: 124 MS
EKG Q-T INTERVAL: 354 MS
EKG QRS DURATION: 78 MS
EKG QTC CALCULATION (BAZETT): 425 MS
EKG T AXIS: -6 DEGREES
EOSINOPHILS ABSOLUTE: 0 K/UL (ref 0–0.6)
EOSINOPHILS RELATIVE PERCENT: 0 % (ref 0–5)
FERRITIN: 1135 NG/ML (ref 13–150)
FIBRINOGEN: 392 MG/DL (ref 238–505)
GFR AFRICAN AMERICAN: >59
GFR NON-AFRICAN AMERICAN: >60
GLUCOSE BLD-MCNC: 102 MG/DL (ref 74–109)
HCT VFR BLD CALC: 42.7 % (ref 37–47)
HEMOGLOBIN: 13 G/DL (ref 12–16)
IMMATURE GRANULOCYTES #: 0.4 K/UL
INR BLD: 1.1 (ref 0.88–1.18)
LACTATE DEHYDROGENASE: 178 U/L (ref 91–215)
LYMPHOCYTES ABSOLUTE: 1.9 K/UL (ref 1.1–4.5)
LYMPHOCYTES RELATIVE PERCENT: 33.8 % (ref 20–40)
MCH RBC QN AUTO: 34.1 PG (ref 27–31)
MCHC RBC AUTO-ENTMCNC: 30.4 G/DL (ref 33–37)
MCV RBC AUTO: 112.1 FL (ref 81–99)
MONOCYTES ABSOLUTE: 0.4 K/UL (ref 0–0.9)
MONOCYTES RELATIVE PERCENT: 7.5 % (ref 0–10)
NEUTROPHILS ABSOLUTE: 2.9 K/UL (ref 1.5–7.5)
NEUTROPHILS RELATIVE PERCENT: 50.2 % (ref 50–65)
PDW BLD-RTO: 13.6 % (ref 11.5–14.5)
PLATELET # BLD: 221 K/UL (ref 130–400)
PMV BLD AUTO: 10.8 FL (ref 9.4–12.3)
POTASSIUM REFLEX MAGNESIUM: 4.7 MMOL/L (ref 3.5–5)
PROTHROMBIN TIME: 14.4 SEC (ref 12–14.6)
RBC # BLD: 3.81 M/UL (ref 4.2–5.4)
SODIUM BLD-SCNC: 142 MMOL/L (ref 136–145)
TOTAL PROTEIN: 6.5 G/DL (ref 6.6–8.7)
VITAMIN D 25-HYDROXY: 52.2 NG/ML
WBC # BLD: 5.7 K/UL (ref 4.8–10.8)

## 2021-09-28 PROCEDURE — 85025 COMPLETE CBC W/AUTO DIFF WBC: CPT

## 2021-09-28 PROCEDURE — 83615 LACTATE (LD) (LDH) ENZYME: CPT

## 2021-09-28 PROCEDURE — 36415 COLL VENOUS BLD VENIPUNCTURE: CPT

## 2021-09-28 PROCEDURE — 82728 ASSAY OF FERRITIN: CPT

## 2021-09-28 PROCEDURE — 6370000000 HC RX 637 (ALT 250 FOR IP): Performed by: INTERNAL MEDICINE

## 2021-09-28 PROCEDURE — 97165 OT EVAL LOW COMPLEX 30 MIN: CPT

## 2021-09-28 PROCEDURE — 85379 FIBRIN DEGRADATION QUANT: CPT

## 2021-09-28 PROCEDURE — 93010 ELECTROCARDIOGRAM REPORT: CPT | Performed by: INTERNAL MEDICINE

## 2021-09-28 PROCEDURE — 6360000002 HC RX W HCPCS: Performed by: INTERNAL MEDICINE

## 2021-09-28 PROCEDURE — 2700000000 HC OXYGEN THERAPY PER DAY

## 2021-09-28 PROCEDURE — 92610 EVALUATE SWALLOWING FUNCTION: CPT

## 2021-09-28 PROCEDURE — 94761 N-INVAS EAR/PLS OXIMETRY MLT: CPT

## 2021-09-28 PROCEDURE — 85610 PROTHROMBIN TIME: CPT

## 2021-09-28 PROCEDURE — 85384 FIBRINOGEN ACTIVITY: CPT

## 2021-09-28 PROCEDURE — 82306 VITAMIN D 25 HYDROXY: CPT

## 2021-09-28 PROCEDURE — 2580000003 HC RX 258: Performed by: INTERNAL MEDICINE

## 2021-09-28 PROCEDURE — 80053 COMPREHEN METABOLIC PANEL: CPT

## 2021-09-28 PROCEDURE — 85730 THROMBOPLASTIN TIME PARTIAL: CPT

## 2021-09-28 PROCEDURE — 97530 THERAPEUTIC ACTIVITIES: CPT

## 2021-09-28 PROCEDURE — 1210000000 HC MED SURG R&B

## 2021-09-28 PROCEDURE — 86140 C-REACTIVE PROTEIN: CPT

## 2021-09-28 RX ADMIN — GABAPENTIN 300 MG: 300 CAPSULE ORAL at 14:06

## 2021-09-28 RX ADMIN — FAMOTIDINE 20 MG: 20 TABLET, FILM COATED ORAL at 08:08

## 2021-09-28 RX ADMIN — FAMOTIDINE 20 MG: 20 TABLET, FILM COATED ORAL at 20:35

## 2021-09-28 RX ADMIN — FENOFIBRATE 160 MG: 160 TABLET ORAL at 08:08

## 2021-09-28 RX ADMIN — GABAPENTIN 300 MG: 300 CAPSULE ORAL at 20:35

## 2021-09-28 RX ADMIN — BUDESONIDE AND FORMOTEROL FUMARATE DIHYDRATE 2 PUFF: 160; 4.5 AEROSOL RESPIRATORY (INHALATION) at 20:34

## 2021-09-28 RX ADMIN — ENOXAPARIN SODIUM 30 MG: 30 INJECTION SUBCUTANEOUS at 20:34

## 2021-09-28 RX ADMIN — DIVALPROEX SODIUM 500 MG: 500 TABLET, DELAYED RELEASE ORAL at 14:06

## 2021-09-28 RX ADMIN — OXYCODONE HYDROCHLORIDE AND ACETAMINOPHEN 500 MG: 500 TABLET ORAL at 08:08

## 2021-09-28 RX ADMIN — RISPERIDONE 0.5 MG: 0.5 TABLET ORAL at 08:07

## 2021-09-28 RX ADMIN — Medication 6000 UNITS: at 08:08

## 2021-09-28 RX ADMIN — SODIUM CHLORIDE, PRESERVATIVE FREE 10 ML: 5 INJECTION INTRAVENOUS at 08:09

## 2021-09-28 RX ADMIN — DIVALPROEX SODIUM 500 MG: 500 TABLET, DELAYED RELEASE ORAL at 20:35

## 2021-09-28 RX ADMIN — RISPERIDONE 0.5 MG: 0.5 TABLET ORAL at 20:35

## 2021-09-28 RX ADMIN — DIVALPROEX SODIUM 500 MG: 500 TABLET, DELAYED RELEASE ORAL at 08:08

## 2021-09-28 RX ADMIN — DEXAMETHASONE SODIUM PHOSPHATE 6 MG: 10 INJECTION, SOLUTION INTRAMUSCULAR; INTRAVENOUS at 14:06

## 2021-09-28 RX ADMIN — FLUOXETINE HYDROCHLORIDE 10 MG: 10 CAPSULE ORAL at 08:07

## 2021-09-28 RX ADMIN — BUSPIRONE HYDROCHLORIDE 5 MG: 5 TABLET ORAL at 08:28

## 2021-09-28 RX ADMIN — BUSPIRONE HYDROCHLORIDE 5 MG: 5 TABLET ORAL at 20:35

## 2021-09-28 RX ADMIN — LEVOTHYROXINE SODIUM 75 MCG: 75 TABLET ORAL at 08:08

## 2021-09-28 RX ADMIN — Medication 5 MG: at 20:36

## 2021-09-28 RX ADMIN — TRAZODONE HYDROCHLORIDE 50 MG: 50 TABLET ORAL at 20:35

## 2021-09-28 RX ADMIN — BUSPIRONE HYDROCHLORIDE 5 MG: 5 TABLET ORAL at 14:06

## 2021-09-28 RX ADMIN — METHYLPHENIDATE HYDROCHLORIDE 10 MG: 5 TABLET ORAL at 08:08

## 2021-09-28 RX ADMIN — GABAPENTIN 300 MG: 300 CAPSULE ORAL at 08:08

## 2021-09-28 RX ADMIN — SODIUM CHLORIDE, PRESERVATIVE FREE 10 ML: 5 INJECTION INTRAVENOUS at 20:35

## 2021-09-28 RX ADMIN — ZINC SULFATE 220 MG (50 MG) CAPSULE 50 MG: CAPSULE at 08:08

## 2021-09-28 ASSESSMENT — PAIN SCALES - WONG BAKER: WONGBAKER_NUMERICALRESPONSE: 0

## 2021-09-28 NOTE — ACP (ADVANCE CARE PLANNING)
Advance Care Planning     Advance Care Planning Activator (Inpatient)  Conversation Note      Date of ACP Conversation: 9/28/2021     Conversation Conducted with: Sister, Renee Lynn    ACP Activator: Gerardo Bradley RN      Health Care Decision Maker:     Current Designated Health Care Decision Maker:     Primary Decision Maker: Shantell Coon - Brother/Sister - [phone]      Care Preferences    Ventilation: \"If you were in your present state of health and suddenly became very ill and were unable to breathe on your own, what would your preference be about the use of a ventilator (breathing machine) if it were available to you? \"      Would the patient desire the use of ventilator (breathing machine)?: Yes        Resuscitation  \"In the event your heart stopped as a result of an underlying serious health condition, would you want attempts to be made to restart your heart (answer \"yes\" for attempt to resuscitate) or would you prefer a natural death (answer \"no\" for do not attempt to resuscitate)? \" Yes            Conversation Outcomes:  [x] ACP discussion completed  [] Existing advance directive reviewed with patient; no changes to patient's previously recorded wishes  [] New Advance Directive completed  [] Portable Do Not Rescitate prepared for Provider review and signature  [] POLST/POST/MOLST/MOST prepared for Provider review and signature      Follow-up plan:    [] Schedule follow-up conversation to continue planning  [] Referred individual to Provider for additional questions/concerns   [] Advised patient/agent/surrogate to review completed ACP document and update if needed with changes in condition, patient preferences or care setting    [] This note routed to one or more involved healthcare providers    Awaiting emergency guardianship from Renee Lynn       Electronically signed by Gerardo Bradley RN on 9/28/2021 at 11:52 AM

## 2021-09-28 NOTE — PROGRESS NOTES
Occupational Therapy   Occupational Therapy Initial Assessment  Date: 2021   Patient Name: Ary Mercado  MRN: [id number]     : 1960    Date of Service: 2021    Discharge Recommendations:  Patient would benefit from continued therapy after discharge       Assessment   Assessment: Evaluation completed and tx initiated. The patient is likely close to baseline for mobility. Will further explore eating skills. REQUIRES OT FOLLOW UP: Yes  Activity Tolerance  Activity Tolerance: Patient Tolerated treatment well  Activity Tolerance: eval and tx modified to patient skill level  Safety Devices  Safety Devices in place: Yes  Type of devices: Call light within reach; Bed alarm in place           Patient Diagnosis(es): The primary encounter diagnosis was Acute hypoxemic respiratory failure due to COVID-19 [facility]). A diagnosis of Dementia without behavioral disturbance, unspecified dementia type [facility]) was also pertinent to this visit. has a past medical history of Palliative care patient. has no past surgical history on file. Restrictions  Restrictions/Precautions  Restrictions/Precautions: Fall Risk  Position Activity Restriction  Other position/activity restrictions: Droplet plus    Subjective   General  Chart Reviewed: Yes  Patient assessed for rehabilitation services?: Yes  Family / Caregiver Present: No  Patient Currently in Pain: No  Vital Signs  Patient Currently in Pain: No  Oxygen Therapy  O2 Device: Nasal cannula  O2 Flow Rate (L/min): 6 L/min  Social/Functional History  Social/Functional History  Type of Home: Facility       Objective           Observation/Palpation  Observation: Patient lies in bed with legs in external rotation and plantar flexion contractures.   Functional Mobility  Functional Mobility Comments: Per attempts at trunk control in supported long sitting, patient is signficantly impaired for trunk control and weightshifting even in supported positions  Toilet Transfers  Toilet Transfers Comments: Recommend Maxi Move  ADL  Feeding: Maximum assistance; Moderate assistance  Grooming: Moderate assistance;Maximum assistance  UE Bathing: Maximum assistance  LE Bathing: Maximum assistance  UE Dressing: Maximum assistance  LE Dressing: Maximum assistance  Toileting: Maximum assistance        Bed mobility  Rolling to Left: Maximum assistance;2 Person assistance  Rolling to Right: Maximum assistance;2 Person assistance  Transfers  Transfer Comments: Recommend Maxi Move     Cognition  Cognition Comment: Patient follows some simple commands, awake, alert, pleasant, requires repetition, difficult to understand due to low vocal quality and volume                 LUE AROM (degrees)  LUE General AROM: severely impaired, some contractures  RUE AROM (degrees)  RUE AROM : WFL                    Tx initiated:   Therapeutic positioning, mobility strategies (15 mins)  Plan   Plan  Times per week: 3-5    G-Code     OutComes Score                                                  AM-PAC Score             Goals  Short term goals  Short term goal 1: Feed self with min A  Short term goal 2: Supervision for therapeutic activity recommendations       Therapy Time   Individual Concurrent Group Co-treatment   Time In           Time Out           Minutes                   Effie High OT Electronically signed by Effie High OT on 9/28/2021 at 4:39 PM

## 2021-09-28 NOTE — CONSULTS
Palliative care following for support and goals of care. Pt presented from [facility] via EMS d/t low O2 sats, SOA. Pt is confused and unable to answer questions. Call made to  to obtain phone numbers for pt rep, Paola Brooks, listed on her  paperwork.  provided number for him and pt sister, Rosa Erwin. Call to Mr. Rhona Field, continuous busy signal.  Call made to Ms. Rhonda Morgan, to obtain information. Sister tells me she is in the process of obtaining emergency guardianship. SW at  has been working with her. At this time awaiting MD signature in order to move forward. Sister does not have another number for pt brother. Pt does have a parent still living, sister tells me he is 80 and very [place] and prefers he not be called. I did explain should something happen and we needed permission to treat, we would legally need to talk with him. Ms. Rhonda Morgan voiced understanding. This nurse requested she call us and get paperwork for guardianship as soon as she rec them. She tells me pt has been at [facility] for 13-15 yrs d/t MS. States she has not been allowed to see pt since [place]. It is her understanding pt has declined to the point of total care and needs to be fed. .  For now pt will remain Full Code. Sister tells me once she gets emergency guardianship she may change this once she talks with their father. I have contacted/left message with our SW to give her information about this pt. Palliative following for support, goals. At this time the plan is for pt to return to [facility].     Electronically signed by Delora Litten, RN on 9/28/2021 at 11:48 AM

## 2021-09-28 NOTE — PROGRESS NOTES
[facility]        Hospitalist Progress Note  9/28/2021 10:46 AM  Subjective:   Admit Date: 9/27/2021  PCP: Swapna Ludwig    Chief Complaint: Desaturation    Subjective: Patient seen and examined at bedside. Appears comfortable. No acute complaints. Pleasantly confused. Cumulative Hospital History: 59 y.o. female with a past medical history of multiple sclerosis, bipolar disorder, COPD, anxiety who presented to the ED from skilled nursing facility after she was noted to have desaturations. Patient is pleasantly confused at baseline (AAO x1, person). She was found to have COVID-19 with associated acute hypoxic respiratory failure and pneumonia. She does not meet criteria for baricitinib or tocilizumab and was started on Decadron and remdesivir. CTA PE study performed showing no evidence of pulmonary embolism. ROS: 14 point review of systems is negative except as specifically addressed above. ADULT DIET;  Regular    Intake/Output Summary (Last 24 hours) at 9/28/2021 1046  Last data filed at 9/28/2021 2130  Gross per 24 hour   Intake 120 ml   Output 1 ml   Net 119 ml     Medications:   sodium chloride       Current Facility-Administered Medications   Medication Dose Route Frequency Provider Last Rate Last Admin    sodium chloride flush 0.9 % injection 5-40 mL  5-40 mL IntraVENous 2 times per day Roma Singh MD   10 mL at 09/28/21 0809    sodium chloride flush 0.9 % injection 5-40 mL  5-40 mL IntraVENous PRN Roma Singh MD        0.9 % sodium chloride infusion  25 mL IntraVENous PRN Roma Singh MD        enoxaparin (LOVENOX) injection 30 mg  30 mg SubCUTAneous BID Roma Singh MD   30 mg at 09/27/21 2114    ondansetron (ZOFRAN-ODT) disintegrating tablet 4 mg  4 mg Oral Q8H PRN Roma Singh MD        Or    ondansetron [facility]) injection 4 mg  4 mg IntraVENous Q6H PRN Roma Singh MD        polyethylene glycol [facility]) packet 17 g  17 g Oral Daily PRN MD Kobe Pruitt acetaminophen (TYLENOL) tablet 650 mg  650 mg Oral Q6H PRN Matthew Wise MD        Or    acetaminophen (TYLENOL) suppository 650 mg  650 mg Rectal Q6H PRN Matthew Wise MD        guaiFENesin-dextromethorphan (ROBITUSSIN DM) 100-10 MG/5ML syrup 5 mL  5 mL Oral Q4H PRN Matthew Wise MD        dexamethasone (PF) (DECADRON) injection 6 mg  6 mg IntraVENous Q24H Matthew Wise MD   6 mg at 09/27/21 1622    Vitamin D (CHOLECALCIFEROL) tablet 6,000 Units  6,000 Units Oral Daily Matthew Wise MD   6,000 Units at 09/28/21 0808    Followed by   Roger Hilton ON 10/4/2021] Vitamin D (CHOLECALCIFEROL) tablet 2,000 Units  2,000 Units Oral Daily Matthew Wise MD        albuterol sulfate  (90 Base) MCG/ACT inhaler 2 puff  2 puff Inhalation Q4H PRN Matthew Wise MD        budesonide-formoterol [facility]) 160-4.5 MCG/ACT inhaler 2 puff  2 puff Inhalation BID Matthew Wise MD        potassium chloride (KLOR-CON M) extended release tablet 40 mEq  40 mEq Oral PRN Matthew Wise MD        Or    potassium bicarb-citric acid (EFFER-K) effervescent tablet 40 mEq  40 mEq Oral PRN Matthew Wise MD        Or    potassium chloride 10 mEq/100 mL IVPB (Peripheral Line)  10 mEq IntraVENous PRN Matthew Wise MD        magnesium sulfate 1000 mg in dextrose 5% 100 mL IVPB  1,000 mg IntraVENous PRN Matthew Wise MD        busPIRone (BUSPAR) tablet 5 mg  5 mg Oral TID Matthew Wise MD   5 mg at 09/28/21 0828    cyclobenzaprine (FLEXERIL) tablet 10 mg  10 mg Oral TID PRN Matthew Wise MD        diphenhydrAMINE (BENADRYL) tablet 25 mg  25 mg Oral Q6H PRN Matthew Wise MD        divalproex (DEPAKOTE) DR tablet 500 mg  500 mg Oral TID Matthew Wise MD   500 mg at 09/28/21 0808    famotidine (PEPCID) tablet 20 mg  20 mg Oral BID Matthew Wise MD   20 mg at 09/28/21 5184    fenofibrate (TRIGLIDE) tablet 160 mg  160 mg Oral Daily Matthew Wise MD   160 mg at 09/28/21 0808    FLUoxetine (PROZAC) capsule 10 mg  10 mg Oral Daily Matthew Wise, MD   10 mg at 09/28/21 4730    gabapentin (NEURONTIN) capsule 300 mg  300 mg Oral TID Deena Olivo MD   300 mg at 09/28/21 0674    glatiramer (COPAXONE) injection 20 mg  20 mg SubCUTAneous Daily Deena Olivo MD        HYDROcodone-acetaminophen [facility]) 7.5-325 MG per tablet 1 tablet  1 tablet Oral Q6H PRN Deena Olivo MD        levothyroxine (SYNTHROID) tablet 75 mcg  75 mcg Oral Daily Deena Olivo MD   75 mcg at 09/28/21 3782    methylphenidate (RITALIN) tablet 10 mg  10 mg Oral BID Deena Olivo MD   10 mg at 09/28/21 7346    risperiDONE (RISPERDAL) tablet 0.5 mg  0.5 mg Oral BID Deena Olivo MD   0.5 mg at 09/28/21 7707    traZODone (DESYREL) tablet 50 mg  50 mg Oral Nightly Deena Olivo MD   50 mg at 09/27/21 2114    remdesivir 200 mg in sodium chloride 0.9 % 250 mL IVPB  200 mg IntraVENous Once Deena Olivo MD        Followed by   Irene Castañeda remdesivir 100 mg in sodium chloride 0.9 % 250 mL IVPB  100 mg IntraVENous Q24H Deena Olivo MD        0.9 % sodium chloride bolus  30 mL IntraVENous PRN Deena Olivo MD        ascorbic acid (VITAMIN C) tablet 500 mg  500 mg Oral Daily Deena Olivo MD   500 mg at 09/28/21 8911    melatonin disintegrating tablet 5 mg  5 mg Oral Nightly Deena Olivo MD   5 mg at 09/27/21 2114    zinc sulfate (ZINCATE) capsule 50 mg  50 mg Oral Daily Deena Olivo MD   50 mg at 09/28/21 5625        Labs:     Recent Labs     09/27/21  1115 09/28/21  0627   WBC 5.3 5.7   RBC 3.83* 3.81*   HGB 13.0 13.0   HCT 43.0 42.7   .3* 112.1*   MCH 33.9* 34.1*   MCHC 30.2* 30.4*    221     Recent Labs     09/27/21  1115 09/27/21  1230 09/28/21  0627     --  142   K 3.4* 3.6 4.7   ANIONGAP 9  --  5*     --  104   CO2 30*  --  33*   BUN 16  --  18   CREATININE 0.4*  --  0.4*   GLUCOSE 98  --  102   CALCIUM 8.8  --  9.3     Recent Labs     09/27/21  1115   MG 1.9     Recent Labs     09/27/21  1115 09/28/21  0627   AST 22 20   ALT 8 7   BILITOT 0.6 0.5   ALKPHOS 78 77 ABGs:No results for input(s): PH, PO2, PCO2, HCO3, BE, O2SAT in the last 72 hours. Troponin T: No results for input(s): TROPONINI in the last 72 hours. INR:   Recent Labs     09/28/21  0556   INR 1.10     Lactic Acid: No results for input(s): LACTA in the last 72 hours. Objective:   Vitals: BP (!) 100/43 Comment: RN notified  Pulse 85   Temp 96.9 °F (36.1 °C) (Temporal)   Resp 20   Wt 135 lb (61.2 kg)   SpO2 90%   24HR INTAKE/OUTPUT:      Intake/Output Summary (Last 24 hours) at 9/28/2021 1046  Last data filed at 9/28/2021 2722  Gross per 24 hour   Intake 120 ml   Output 1 ml   Net 119 ml     General appearance: Alert, pleasantly confused  HEENT: AT/NC  Lungs: BLAE  Heart: RRR  Abdomen: BS+  Extremities: pulses+, contractures  Neurologic: Alert, weak  Skin: warm      Assessment and Plan:   Principal Problem:    Pneumonia due to COVID-19 virus  Active Problems:    Acute respiratory failure with hypoxia ([address]    COVID-19    Hx of multiple sclerosis (HCC)    Bipolar disorder (HCC)    COPD (chronic obstructive pulmonary disease) (HCC)    Hyperlipidemia    Anxiety    Palliative care patient  Resolved Problems:    * No resolved hospital problems. *    COVID-19: Bronchodilators. Decadron. Remdesivir. Does not meet criteria for baricitinib/tocilizumab. Vitamin C/D/Zinc/Melatonin. Monitor O2 saturations.     Hypothyroidism: Synthroid.     HLD: Statin     Supportive management.      Advance Directive: Full Code    DVT prophylaxis: Lovenox    Discharge planning: TBD-likely back to skilled nursing facility soon if oxygenation remains stable      Signed:  Suha Sanchez MD 9/28/2021 10:46 AM  Rounding Hospitalist

## 2021-09-28 NOTE — PROGRESS NOTES
Minced and moist   Liquid Consistency Recommendation: Mildly thick (nectar thick)  Recommended Form of Meds: Meds crushed in puree as able  Therapeutic Interventions: Patient/Family education;Diet tolerance monitoring; Therapeutic PO trials with SLP     Compensatory Swallowing Strategies  Compensatory Swallowing Strategies: Upright as possible for all oral intake;Small bites/sips;Eat/Feed slowly; Alternate solids and liquids; Remain upright for 30-45 minutes after meals     Treatment/Goals  Timeframe for Short-term Goals: 1x/day for 3 days   Goal 1: Patient will tolerate minced and moist consistency and mildly thick/nectar thick liquids with min S/S penetration/aspiration during PO intake. Goal 2: Patient staff will follow swallow safety recommendations to decrease risk of penetration/aspiration during PO intake. Goal 3: Re-assessment of swallow function for potential diet upgrade. Goal 4: Monitor speech production. General  Chart Reviewed: Yes  Behavior/Cognition: Patient appeared lethargic within short period of time. O2 Device: Nasal Cannula  Communication Observation: (Patient exhibited decreased volume of speech and slow, decreased lingual movements during min verbalizations.)  Patient Positioning: Upright in bed  Consistencies Administered: Dysphagia Pureed (Dysphagia I); Honey - straw;Nectar - straw; Thin - straw      Assessed patient's swallowing function with the following observations noted:     Oral Phase  Mastication: Puree (Patient exhibited slow, decreased vertical jaw movement at the front of the mouth during oral prep of puree consistency trials presented by SLP.)  Suspected Premature Bolus Loss: Puree; Honey - straw;Nectar - straw; Thin - straw (Patient exhibited slow oral transit of puree consistency trials.  Patient exhibited slow oral transit with honey thick liquid trials, nectar thick liquid trials, and thin H2O trials all released in the cheek via straw by SLP.)  Oral Phase - Comment: Min puree consistency residue was noted post swallows; residue was slow but cleared from the mouth with additional dry swallows. Pharyngeal Phase  Suspected Swallow Delay: Puree; Honey - straw;Nectar - straw; Thin - straw (Suspect secondary to oral transit times.)  Laryngeal Elevation: (Patient exhibited sluggish, moderately decreased laryngeal elevation for swallow airway protection.)  Delayed Cough: All   Pharyngeal Phase - Comment: It is noted that patient exhibited consistent delayed coughs with every consistency presented during assessment this date (puree; honey; nectar; thin). However, do not feel that all coughs were related to penetration/aspiration secondary to timing of swallows and presence of throat movement and patient exhibited mild coughs at rest.     At this time, would downgrade to minced and moist consistency with mildly thick/nectar thick liquids. Recommend meds crushed in pudding/applesauce. If patient receives mouth care prior to intake, okay for ice chips and small sips thin H2O IN BETWEEN MEALS for comfort. Will continue to follow.     Electronically signed by SIMA Hutchison on 9/28/2021 at 2:20 PM

## 2021-09-29 LAB
ALBUMIN SERPL-MCNC: 2.9 G/DL (ref 3.5–5.2)
ALP BLD-CCNC: 84 U/L (ref 35–104)
ALT SERPL-CCNC: 8 U/L (ref 5–33)
ANION GAP SERPL CALCULATED.3IONS-SCNC: 7 MMOL/L (ref 7–19)
AST SERPL-CCNC: 21 U/L (ref 5–32)
BASOPHILS ABSOLUTE: 0 K/UL (ref 0–0.2)
BASOPHILS RELATIVE PERCENT: 0 % (ref 0–1)
BILIRUB SERPL-MCNC: 0.6 MG/DL (ref 0.2–1.2)
BUN BLDV-MCNC: 19 MG/DL (ref 8–23)
C-REACTIVE PROTEIN: 6.03 MG/DL (ref 0–0.5)
CALCIUM SERPL-MCNC: 9.1 MG/DL (ref 8.8–10.2)
CHLORIDE BLD-SCNC: 102 MMOL/L (ref 98–111)
CO2: 33 MMOL/L (ref 22–29)
CREAT SERPL-MCNC: 0.4 MG/DL (ref 0.5–0.9)
D DIMER: <0.27 UG/ML FEU (ref 0–0.48)
EOSINOPHILS ABSOLUTE: 0 K/UL (ref 0–0.6)
EOSINOPHILS RELATIVE PERCENT: 0 % (ref 0–5)
GFR AFRICAN AMERICAN: >59
GFR NON-AFRICAN AMERICAN: >60
GLUCOSE BLD-MCNC: 105 MG/DL (ref 74–109)
HCT VFR BLD CALC: 41.8 % (ref 37–47)
HEMOGLOBIN: 12.9 G/DL (ref 12–16)
IMMATURE GRANULOCYTES #: 0.7 K/UL
LYMPHOCYTES ABSOLUTE: 3.8 K/UL (ref 1.1–4.5)
LYMPHOCYTES RELATIVE PERCENT: 32 % (ref 20–40)
MCH RBC QN AUTO: 33.8 PG (ref 27–31)
MCHC RBC AUTO-ENTMCNC: 30.9 G/DL (ref 33–37)
MCV RBC AUTO: 109.4 FL (ref 81–99)
MONOCYTES ABSOLUTE: 1 K/UL (ref 0–0.9)
MONOCYTES RELATIVE PERCENT: 8 % (ref 0–10)
NEUTROPHILS ABSOLUTE: 7.1 K/UL (ref 1.5–7.5)
NEUTROPHILS RELATIVE PERCENT: 60 % (ref 50–65)
PAPPENHEIMER BODIES: ABNORMAL
PDW BLD-RTO: 13.5 % (ref 11.5–14.5)
PLATELET # BLD: 304 K/UL (ref 130–400)
PMV BLD AUTO: 10.8 FL (ref 9.4–12.3)
POTASSIUM REFLEX MAGNESIUM: 4.9 MMOL/L (ref 3.5–5)
PROCALCITONIN: 0.09 NG/ML (ref 0–0.09)
RBC # BLD: 3.82 M/UL (ref 4.2–5.4)
SODIUM BLD-SCNC: 142 MMOL/L (ref 136–145)
TOTAL PROTEIN: 6.6 G/DL (ref 6.6–8.7)
WBC # BLD: 11.9 K/UL (ref 4.8–10.8)

## 2021-09-29 PROCEDURE — 6360000002 HC RX W HCPCS: Performed by: INTERNAL MEDICINE

## 2021-09-29 PROCEDURE — 1210000000 HC MED SURG R&B

## 2021-09-29 PROCEDURE — XW0DXM6 INTRODUCTION OF BARICITINIB INTO MOUTH AND PHARYNX, EXTERNAL APPROACH, NEW TECHNOLOGY GROUP 6: ICD-10-PCS | Performed by: STUDENT IN AN ORGANIZED HEALTH CARE EDUCATION/TRAINING PROGRAM

## 2021-09-29 PROCEDURE — 6370000000 HC RX 637 (ALT 250 FOR IP): Performed by: INTERNAL MEDICINE

## 2021-09-29 PROCEDURE — 97161 PT EVAL LOW COMPLEX 20 MIN: CPT

## 2021-09-29 PROCEDURE — 6370000000 HC RX 637 (ALT 250 FOR IP): Performed by: STUDENT IN AN ORGANIZED HEALTH CARE EDUCATION/TRAINING PROGRAM

## 2021-09-29 PROCEDURE — 85025 COMPLETE CBC W/AUTO DIFF WBC: CPT

## 2021-09-29 PROCEDURE — 80053 COMPREHEN METABOLIC PANEL: CPT

## 2021-09-29 PROCEDURE — 2580000003 HC RX 258: Performed by: INTERNAL MEDICINE

## 2021-09-29 PROCEDURE — 36415 COLL VENOUS BLD VENIPUNCTURE: CPT

## 2021-09-29 PROCEDURE — 6360000002 HC RX W HCPCS: Performed by: STUDENT IN AN ORGANIZED HEALTH CARE EDUCATION/TRAINING PROGRAM

## 2021-09-29 PROCEDURE — 2700000000 HC OXYGEN THERAPY PER DAY

## 2021-09-29 PROCEDURE — 97535 SELF CARE MNGMENT TRAINING: CPT

## 2021-09-29 PROCEDURE — 84145 PROCALCITONIN (PCT): CPT

## 2021-09-29 PROCEDURE — 86140 C-REACTIVE PROTEIN: CPT

## 2021-09-29 PROCEDURE — 94761 N-INVAS EAR/PLS OXIMETRY MLT: CPT

## 2021-09-29 PROCEDURE — 85379 FIBRIN DEGRADATION QUANT: CPT

## 2021-09-29 PROCEDURE — 2580000003 HC RX 258: Performed by: STUDENT IN AN ORGANIZED HEALTH CARE EDUCATION/TRAINING PROGRAM

## 2021-09-29 PROCEDURE — 2500000003 HC RX 250 WO HCPCS: Performed by: STUDENT IN AN ORGANIZED HEALTH CARE EDUCATION/TRAINING PROGRAM

## 2021-09-29 RX ORDER — BUDESONIDE AND FORMOTEROL FUMARATE DIHYDRATE 160; 4.5 UG/1; UG/1
2 AEROSOL RESPIRATORY (INHALATION) 2 TIMES DAILY
Status: DISCONTINUED | OUTPATIENT
Start: 2021-09-29 | End: 2021-09-29 | Stop reason: SDUPTHER

## 2021-09-29 RX ORDER — ASCORBIC ACID 500 MG
1500 TABLET ORAL 3 TIMES DAILY
Status: DISCONTINUED | OUTPATIENT
Start: 2021-09-29 | End: 2021-10-01 | Stop reason: HOSPADM

## 2021-09-29 RX ORDER — METHYLPREDNISOLONE SODIUM SUCCINATE 125 MG/2ML
60 INJECTION, POWDER, LYOPHILIZED, FOR SOLUTION INTRAMUSCULAR; INTRAVENOUS EVERY 6 HOURS
Status: DISCONTINUED | OUTPATIENT
Start: 2021-09-30 | End: 2021-09-30

## 2021-09-29 RX ORDER — ALBUTEROL SULFATE 90 UG/1
2 AEROSOL, METERED RESPIRATORY (INHALATION) 4 TIMES DAILY
Status: DISCONTINUED | OUTPATIENT
Start: 2021-09-29 | End: 2021-10-01 | Stop reason: HOSPADM

## 2021-09-29 RX ADMIN — BUDESONIDE AND FORMOTEROL FUMARATE DIHYDRATE 2 PUFF: 160; 4.5 AEROSOL RESPIRATORY (INHALATION) at 20:31

## 2021-09-29 RX ADMIN — ALBUTEROL SULFATE 2 PUFF: 90 AEROSOL, METERED RESPIRATORY (INHALATION) at 20:31

## 2021-09-29 RX ADMIN — GABAPENTIN 300 MG: 300 CAPSULE ORAL at 13:43

## 2021-09-29 RX ADMIN — METHYLPHENIDATE HYDROCHLORIDE 10 MG: 5 TABLET ORAL at 09:09

## 2021-09-29 RX ADMIN — ALBUTEROL SULFATE 2 PUFF: 90 AEROSOL, METERED RESPIRATORY (INHALATION) at 13:42

## 2021-09-29 RX ADMIN — ENOXAPARIN SODIUM 30 MG: 30 INJECTION SUBCUTANEOUS at 09:07

## 2021-09-29 RX ADMIN — FLUOXETINE HYDROCHLORIDE 10 MG: 10 CAPSULE ORAL at 09:09

## 2021-09-29 RX ADMIN — BUSPIRONE HYDROCHLORIDE 5 MG: 5 TABLET ORAL at 13:43

## 2021-09-29 RX ADMIN — DIVALPROEX SODIUM 500 MG: 500 TABLET, DELAYED RELEASE ORAL at 13:43

## 2021-09-29 RX ADMIN — ZINC SULFATE 220 MG (50 MG) CAPSULE 50 MG: CAPSULE at 09:09

## 2021-09-29 RX ADMIN — RISPERIDONE 0.5 MG: 0.5 TABLET ORAL at 20:31

## 2021-09-29 RX ADMIN — ALBUTEROL SULFATE 2 PUFF: 90 AEROSOL, METERED RESPIRATORY (INHALATION) at 17:17

## 2021-09-29 RX ADMIN — OXYCODONE HYDROCHLORIDE AND ACETAMINOPHEN 1500 MG: 500 TABLET ORAL at 13:43

## 2021-09-29 RX ADMIN — GABAPENTIN 300 MG: 300 CAPSULE ORAL at 09:09

## 2021-09-29 RX ADMIN — BUSPIRONE HYDROCHLORIDE 5 MG: 5 TABLET ORAL at 09:09

## 2021-09-29 RX ADMIN — BUSPIRONE HYDROCHLORIDE 5 MG: 5 TABLET ORAL at 20:23

## 2021-09-29 RX ADMIN — OXYCODONE HYDROCHLORIDE AND ACETAMINOPHEN 1500 MG: 500 TABLET ORAL at 09:09

## 2021-09-29 RX ADMIN — METHYLPHENIDATE HYDROCHLORIDE 10 MG: 5 TABLET ORAL at 17:17

## 2021-09-29 RX ADMIN — RISPERIDONE 0.5 MG: 0.5 TABLET ORAL at 09:09

## 2021-09-29 RX ADMIN — Medication 5 MG: at 20:23

## 2021-09-29 RX ADMIN — BUDESONIDE AND FORMOTEROL FUMARATE DIHYDRATE 2 PUFF: 160; 4.5 AEROSOL RESPIRATORY (INHALATION) at 09:08

## 2021-09-29 RX ADMIN — GABAPENTIN 300 MG: 300 CAPSULE ORAL at 20:23

## 2021-09-29 RX ADMIN — FENOFIBRATE 160 MG: 160 TABLET ORAL at 09:09

## 2021-09-29 RX ADMIN — TRAZODONE HYDROCHLORIDE 50 MG: 50 TABLET ORAL at 20:23

## 2021-09-29 RX ADMIN — Medication 6000 UNITS: at 09:09

## 2021-09-29 RX ADMIN — LEVOTHYROXINE SODIUM 75 MCG: 75 TABLET ORAL at 09:09

## 2021-09-29 RX ADMIN — METHYLPREDNISOLONE SODIUM SUCCINATE 1000 MG: 1 INJECTION, POWDER, LYOPHILIZED, FOR SOLUTION INTRAMUSCULAR; INTRAVENOUS at 10:55

## 2021-09-29 RX ADMIN — OXYCODONE HYDROCHLORIDE AND ACETAMINOPHEN 1500 MG: 500 TABLET ORAL at 20:23

## 2021-09-29 RX ADMIN — SODIUM CHLORIDE, PRESERVATIVE FREE 10 ML: 5 INJECTION INTRAVENOUS at 09:08

## 2021-09-29 RX ADMIN — DIVALPROEX SODIUM 500 MG: 500 TABLET, DELAYED RELEASE ORAL at 09:09

## 2021-09-29 RX ADMIN — ENOXAPARIN SODIUM 30 MG: 30 INJECTION SUBCUTANEOUS at 20:23

## 2021-09-29 RX ADMIN — BARICITINIB 4 MG: 2 TABLET, FILM COATED ORAL at 11:28

## 2021-09-29 RX ADMIN — DIVALPROEX SODIUM 500 MG: 500 TABLET, DELAYED RELEASE ORAL at 20:23

## 2021-09-29 RX ADMIN — FAMOTIDINE 40 MG: 10 INJECTION, SOLUTION INTRAVENOUS at 09:07

## 2021-09-29 NOTE — PROGRESS NOTES
[facility]        Hospitalist Progress Note  9/29/2021 3:06 PM  Subjective:   Admit Date: 9/27/2021  PCP: Ascencion Carrel    Chief Complaint: Oxygen desaturation        Subjective: No acute events overnight. Pleasantly confused. Denies any shortness of breath. Remains on 5-6 L nasal cannula supplemental O2. Cumulative Hospital History: 59 y.o. female with a past medical history of multiple sclerosis, bipolar disorder, COPD, anxiety who presented to the ED from skilled nursing facility after she was noted to have desaturations. Patient is pleasantly confused at baseline (AAO x1, person). She was found to have COVID-19 with associated acute hypoxic respiratory failure and pneumonia. CTA PE study performed showing no evidence of pulmonary embolism. Patient initially required high flow O2 with 15 L on nonrebreather mask. O2 was weaned down to 3 L in first 24 hours, however subsequently had increased O2 requirements with increasing CRP. She was treated with corticosteroids and baricitinib. ROS: Unable to complete comprehensive ROS due to baseline mental status    ADULT DIET;  Dysphagia - Minced and Moist; Mildly Thick (Nectar)    Intake/Output Summary (Last 24 hours) at 9/29/2021 1506  Last data filed at 9/29/2021 1233  Gross per 24 hour   Intake 240 ml   Output 300 ml   Net -60 ml     Medications:   sodium chloride       Current Facility-Administered Medications   Medication Dose Route Frequency Provider Last Rate Last Admin    ascorbic acid (VITAMIN C) tablet 1,500 mg  1,500 mg Oral TID Clara Luther MD   1,500 mg at 09/29/21 1343    famotidine (PEPCID) injection 40 mg  40 mg IntraVENous Daily Clara Luther MD   40 mg at 09/29/21 0907    albuterol sulfate  (90 Base) MCG/ACT inhaler 2 puff  2 puff Inhalation 4x daily Clara Luther MD   2 puff at 09/29/21 1342    baricitinib (OLUMIANT) tablet 4 mg  4 mg Oral Daily Clara Luther MD   4 mg at 09/29/21 1128    sodium PRN Chloe Fox MD        diphenhydrAMINE (BENADRYL) tablet 25 mg  25 mg Oral Q6H PRN Chloe Fox MD        divalproex (DEPAKOTE) DR tablet 500 mg  500 mg Oral TID Chloe Fox MD   500 mg at 09/29/21 1343    fenofibrate (TRIGLIDE) tablet 160 mg  160 mg Oral Daily Chloe Fox MD   160 mg at 09/29/21 0909    FLUoxetine (PROZAC) capsule 10 mg  10 mg Oral Daily Chloe Fox MD   10 mg at 09/29/21 3305    gabapentin (NEURONTIN) capsule 300 mg  300 mg Oral TID Chloe Fox MD   300 mg at 09/29/21 1343    glatiramer (COPAXONE) injection 20 mg  20 mg SubCUTAneous Daily Chloe Fox MD        HYDROcodone-acetaminophen [facility]) 7.5-325 MG per tablet 1 tablet  1 tablet Oral Q6H PRN Chloe Fox MD        levothyroxine (SYNTHROID) tablet 75 mcg  75 mcg Oral Daily Chloe Fox MD   75 mcg at 09/29/21 8341    methylphenidate (RITALIN) tablet 10 mg  10 mg Oral BID Chloe Fox MD   10 mg at 09/29/21 1449    risperiDONE (RISPERDAL) tablet 0.5 mg  0.5 mg Oral BID Chloe Fox MD   0.5 mg at 09/29/21 5332    traZODone (DESYREL) tablet 50 mg  50 mg Oral Nightly Chloe Fox MD   50 mg at 09/28/21 2035    0.9 % sodium chloride bolus  30 mL IntraVENous PRN Chloe Fox MD        melatonin disintegrating tablet 5 mg  5 mg Oral Nightly Chloe Fox MD   5 mg at 09/28/21 2036    zinc sulfate (ZINCATE) capsule 50 mg  50 mg Oral Daily Chloe Fox MD   50 mg at 09/29/21 0909        Labs:     Recent Labs     09/27/21  1115 09/28/21  0627 09/29/21  0538   WBC 5.3 5.7 11.9*   RBC 3.83* 3.81* 3.82*   HGB 13.0 13.0 12.9   HCT 43.0 42.7 41.8   .3* 112.1* 109.4*   MCH 33.9* 34.1* 33.8*   MCHC 30.2* 30.4* 30.9*    221 304     Recent Labs     09/27/21  1115 09/27/21  1230 09/28/21  0627 09/29/21  0538     --  142 142   K 3.4* 3.6 4.7 4.9   ANIONGAP 9  --  5* 7     --  104 102   CO2 30*  --  33* 33*   BUN 16  --  18 19   CREATININE 0.4*  --  0.4* 0.4*   GLUCOSE 98  --  102 105   CALCIUM 8.8  -- 9.3 9.1     Recent Labs     09/27/21  1115   MG 1.9     Recent Labs     09/27/21  1115 09/28/21  0627 09/29/21  0538   AST 22 20 21   ALT 8 7 8   BILITOT 0.6 0.5 0.6   ALKPHOS 78 77 84     ABGs:No results for input(s): PH, PO2, PCO2, HCO3, BE, O2SAT in the last 72 hours. Troponin T: No results for input(s): TROPONINI in the last 72 hours. INR:   Recent Labs     09/28/21  0556   INR 1.10     Lactic Acid: No results for input(s): LACTA in the last 72 hours. Objective:   Vitals: /75   Pulse 95   Temp 97.9 °F (36.6 °C) (Temporal)   Resp 20   Wt 135 lb (61.2 kg)   SpO2 93%   24HR INTAKE/OUTPUT:      Intake/Output Summary (Last 24 hours) at 9/29/2021 1506  Last data filed at 9/29/2021 1233  Gross per 24 hour   Intake 240 ml   Output 300 ml   Net -60 ml     Physical exam    General appearance: Alert, pleasantly confused  HEENT: Normocephalic, atraumatic  Lungs: No increased work of breathing, no intercostal retractions  Heart:  Normal rate, regular rhythm  Abdomen: Nondistended  Extremities:  Contractures noted, no clubbing or cyanosis  Neurologic: Alert, no dysarthria or aphasia  Skin: No lesions or rashes observed        Assessment and Plan:   Principal Problem:    Pneumonia due to COVID-19 virus  Active Problems:    Acute respiratory failure with hypoxia (HCC)    COVID-19    Hx of multiple sclerosis (HCC)    Bipolar disorder (HCC)    COPD (chronic obstructive pulmonary disease) (HCC)    Hyperlipidemia    Anxiety    Palliative care patient  Resolved Problems:    * No resolved hospital problems. *    Acute hypoxemic respiratory failure due to COVID-19 pneumonia  Supplemental O2 as needed, goal SPO2 90% and above  Wean O2 as able  Encourage incentive spirometry and self proning as able  Continue corticosteroids, follow CRP  Anticoagulation with Lovenox, follow D-dimer  Give baricitinib  Adjunctive therapy  Monitor CBC, CMP     Hypothyroidism: Synthroid.     HLD: Statin     Supportive management. Advance Directive: Full Code    DVT prophylaxis: Lovenox    Discharge planning: TBD-likely back to skilled nursing facility soon if oxygenation remains stable or improved      Signed:  Gisela Larry MD 9/29/2021 3:06 PM

## 2021-09-29 NOTE — PROGRESS NOTES
Pharmacy Consult      Ramsey Alexander is a 59 y.o. female for whom pharmacy has been consulted to dose baricitinib. Patient Active Problem List   Diagnosis    Pneumonia due to COVID-19 virus    Acute respiratory failure with hypoxia [address]    COVID-19    Hx of multiple sclerosis ([facility]    Bipolar disorder ([facility]    COPD (chronic obstructive pulmonary disease) ([facility]    Hyperlipidemia    Anxiety    Palliative care patient       Allergies:  Patient has no known allergies. Ht/Wt:   Ht Readings from Last 1 Encounters:   No data found for Ht        Wt Readings from Last 1 Encounters:   09/27/21 135 lb (61.2 kg)         Recent Labs     09/27/21  1115 09/28/21  0627 09/29/21  0538   LABGLOM >60 >60 >60   GFRAA >59 >59 >59   LYMPHSABS 2.0 1.9 3.8   NEUTROABS 2.5 2.9 7.1   ALT 8 7 8   AST 22 20 21           Assessment/Plan:    Start patient on baricitinib 4 mg daily for 14 days of total treatment or until hospital discharge, whichever is first. Pharmacy will continue to follow GFR, ALC, ANC and aminotransferases. Thank you for the consult.      Electronically signed by Claudius Hammans, [facility] on 9/29/2021 at 9:44 AM

## 2021-09-29 NOTE — PROGRESS NOTES
Physical Therapy    Facility/Department: [facility] ONCOLOGY UNIT  Initial Assessment    NAME: Cecilia Phillips  : 1960  MRN: [id number]    Date of Service: 2021    Discharge Recommendations:  24 hour supervision or assist        Assessment   Body structures, Functions, Activity limitations: Decreased functional mobility ; Decreased ROM; Decreased strength;Decreased cognition;Decreased safe awareness;Decreased posture;Decreased coordination  Assessment: Pt. most likely at prior functional level. Pt's BLEs contractured and functionally pt would not be able to stand on her feet and most likely has not been able for some time. Anticipate pt will return to SNF and will require frequent positioning. Pt. would be a mechanical lift to the chair. Treatment Diagnosis: debility  Prognosis: Guarded  Decision Making: Low Complexity  PT Education: General Safety;PT Role  Patient Education: use of call light  Barriers to Learning: lethargy, confusion  REQUIRES PT FOLLOW UP: No  Activity Tolerance  Activity Tolerance: Patient limited by cognitive status  Activity Tolerance: lethargy, confusion       Patient Diagnosis(es): The primary encounter diagnosis was Acute hypoxemic respiratory failure due to COVID-19 [facility]). A diagnosis of Dementia without behavioral disturbance, unspecified dementia type [facility]) was also pertinent to this visit. has a past medical history of Palliative care patient. has no past surgical history on file.     Restrictions  Restrictions/Precautions  Restrictions/Precautions: Fall Risk, Isolation  Required Braces or Orthoses?: No  Position Activity Restriction  Other position/activity restrictions: Droplet plus  Vision/Hearing        Subjective  General  Chart Reviewed: Yes  Patient assessed for rehabilitation services?: Yes  Response To Previous Treatment: Not applicable  Family / Caregiver Present: No  Referring Practitioner: Chloe Fox MD  Referral Date : 21  Diagnosis: acute hypoxemic respiratory failure due to covid 19, dementia without behavioral disturbance  Follows Commands: Impaired  General Comment  Comments: RNJim, aware pt getting PT eval.  Subjective  Subjective: Pt. opened eyes briefly when asked to do therapy. Pain Screening  Patient Currently in Pain: No  Vital Signs  Patient Currently in Pain: No  Pre Treatment Pain Screening  Intervention List: Patient able to continue with treatment    Orientation  Orientation  Overall Orientation Status: Impaired (drowsy)  Social/Functional History  Social/Functional History  Type of Home: Facility  Cognition   Cognition  Overall Cognitive Status: Exceptions  Following Commands: Does not follow commands  Safety Judgement: Decreased awareness of need for safety  Problem Solving: Decreased awareness of errors  Insights: Not aware of deficits  Initiation: Requires cues for all  Sequencing: Requires cues for all  Cognition Comment: pt unable to effectively participate in therapy at this time    Objective     Observation/Palpation  Posture: Poor  Observation: Patient lies in bed with legs in external rotation and knee and plantar flexion contractures. AROM RLE (degrees)  RLE AROM: Exceptions  RLE General AROM: PF contracture, hips in ER, knee flexion contracture  AROM LLE (degrees)  LLE AROM : Exceptions  LLE General AROM: PF contracture, hips in ER, knee flexion contracture  Strength RLE  Strength RLE: Exception  Comment: 0/5  Strength LLE  Strength LLE: Exception  Comment: 0/5        Bed mobility  Rolling to Left: Dependent/Total  Rolling to Right: Dependent/Total  Sit to Supine: Unable to assess  Scooting: Dependent/Total  Transfers  Sit to Stand: Unable to assess  Stand to sit: Unable to assess  Ambulation  Ambulation?: No     Balance  Posture: Poor  Comments: pt's B hips in ER        Plan   Plan  Times per week: eval only  Plan Comment: d/c PT. No goals set.   Safety Devices  Type of devices: Patient at risk for falls, Left in bed, Bed alarm in place    G-Code       OutComes Score                                                  AM-PAC Score             Goals  Patient Goals   Patient goals : not stated       Therapy Time   Individual Concurrent Group Co-treatment   Time In           Time Out           Minutes                   Marion Pardo PT     Electronically signed by Marion Pardo PT on 9/29/2021 at 3:35 PM

## 2021-09-29 NOTE — PROGRESS NOTES
assist to manage drinking water. Pt required Max assist for bed mobility and repositioning. Pt had very large bowel movement in the form of a ball this date. Nursing aware. Discharge Recommendations Patient would benefit from continued therapy after discharge   Activity Tolerance   Activity Tolerance Patient Tolerated treatment well;Treatment limited secondary to decreased cognition   Safety Devices   Safety Devices in place Yes   Type of devices Call light within reach; Bed alarm in place   Other Comments   Comments LUE contracture. Little to no movement in BUEs; trace movement in RLE. Presents with large open sore on backside.     Plan   Times per week 3-5   Times per day Daily   Electronically signed by MANN Mc on 9/29/2021 at 12:03 PM

## 2021-09-29 NOTE — PLAN OF CARE
Problem: Airway Clearance - Ineffective  Goal: Achieve or maintain patent airway  9/28/2021 2242 by Rito Drake RN  Outcome: Ongoing  9/28/2021 0942 by Hawa Salvador RN  Outcome: Ongoing     Problem: Gas Exchange - Impaired  Goal: Absence of hypoxia  9/28/2021 2242 by Rito Drake RN  Outcome: Ongoing  9/28/2021 0942 by Hawa Salvador RN  Outcome: Ongoing  Goal: Promote optimal lung function  9/28/2021 2242 by Rito Drake RN  Outcome: Ongoing  9/28/2021 0942 by Hawa Salvador RN  Outcome: Ongoing     Problem: Breathing Pattern - Ineffective  Goal: Ability to achieve and maintain a regular respiratory rate  9/28/2021 2242 by Rito Drake RN  Outcome: Ongoing  9/28/2021 0942 by Hawa Salvador RN  Outcome: Ongoing     Problem:  Body Temperature -  Risk of, Imbalanced  Goal: Ability to maintain a body temperature within defined limits  9/28/2021 2242 by Rito Drake RN  Outcome: Ongoing  9/28/2021 0942 by Hawa Salvador RN  Outcome: Ongoing  Goal: Will regain or maintain usual level of consciousness  9/28/2021 2242 by Rito Drake RN  Outcome: Ongoing  9/28/2021 0942 by Hawa Salvador RN  Outcome: Ongoing  Goal: Complications related to the disease process, condition or treatment will be avoided or minimized  9/28/2021 2242 by Rito Drake RN  Outcome: Ongoing  9/28/2021 0942 by Hawa Salvador RN  Outcome: Ongoing     Problem: Isolation Precautions - Risk of Spread of Infection  Goal: Prevent transmission of infection  9/28/2021 2242 by Rito Drake RN  Outcome: Ongoing  9/28/2021 0942 by Hawa Salvador RN  Outcome: Ongoing

## 2021-09-30 LAB
ALBUMIN SERPL-MCNC: 2.6 G/DL (ref 3.5–5.2)
ALP BLD-CCNC: 69 U/L (ref 35–104)
ALT SERPL-CCNC: 6 U/L (ref 5–33)
ANION GAP SERPL CALCULATED.3IONS-SCNC: 8 MMOL/L (ref 7–19)
AST SERPL-CCNC: 22 U/L (ref 5–32)
BASOPHILS ABSOLUTE: 0 K/UL (ref 0–0.2)
BASOPHILS RELATIVE PERCENT: 0 % (ref 0–1)
BILIRUB SERPL-MCNC: 0.6 MG/DL (ref 0.2–1.2)
BUN BLDV-MCNC: 21 MG/DL (ref 8–23)
C-REACTIVE PROTEIN: 4.91 MG/DL (ref 0–0.5)
CALCIUM SERPL-MCNC: 8.5 MG/DL (ref 8.8–10.2)
CHLORIDE BLD-SCNC: 103 MMOL/L (ref 98–111)
CO2: 31 MMOL/L (ref 22–29)
CREAT SERPL-MCNC: 0.3 MG/DL (ref 0.5–0.9)
D DIMER: <0.27 UG/ML FEU (ref 0–0.48)
EOSINOPHILS ABSOLUTE: 0 K/UL (ref 0–0.6)
EOSINOPHILS RELATIVE PERCENT: 0 % (ref 0–5)
GFR AFRICAN AMERICAN: >59
GFR NON-AFRICAN AMERICAN: >60
GLUCOSE BLD-MCNC: 129 MG/DL (ref 74–109)
HCT VFR BLD CALC: 37.9 % (ref 37–47)
HEMOGLOBIN: 11.8 G/DL (ref 12–16)
HYPOCHROMIA: ABNORMAL
IMMATURE GRANULOCYTES #: 0.6 K/UL
LYMPHOCYTES ABSOLUTE: 0.9 K/UL (ref 1.1–4.5)
LYMPHOCYTES RELATIVE PERCENT: 9 % (ref 20–40)
MACROCYTES: ABNORMAL
MCH RBC QN AUTO: 33.8 PG (ref 27–31)
MCHC RBC AUTO-ENTMCNC: 31.1 G/DL (ref 33–37)
MCV RBC AUTO: 108.6 FL (ref 81–99)
METAMYELOCYTES RELATIVE PERCENT: 1 %
MONOCYTES ABSOLUTE: 0.3 K/UL (ref 0–0.9)
MONOCYTES RELATIVE PERCENT: 3 % (ref 0–10)
MYELOCYTE PERCENT: 3 %
NEUTROPHILS ABSOLUTE: 8.6 K/UL (ref 1.5–7.5)
NEUTROPHILS RELATIVE PERCENT: 84 % (ref 50–65)
PDW BLD-RTO: 13.6 % (ref 11.5–14.5)
PLATELET # BLD: 290 K/UL (ref 130–400)
PLATELET SLIDE REVIEW: ADEQUATE
PMV BLD AUTO: 11 FL (ref 9.4–12.3)
POLYCHROMASIA: ABNORMAL
POTASSIUM REFLEX MAGNESIUM: 4.7 MMOL/L (ref 3.5–5)
RBC # BLD: 3.49 M/UL (ref 4.2–5.4)
SODIUM BLD-SCNC: 142 MMOL/L (ref 136–145)
TOTAL PROTEIN: 6.3 G/DL (ref 6.6–8.7)
TOXIC GRANULATION: ABNORMAL
WBC # BLD: 9.8 K/UL (ref 4.8–10.8)

## 2021-09-30 PROCEDURE — 6360000002 HC RX W HCPCS: Performed by: INTERNAL MEDICINE

## 2021-09-30 PROCEDURE — 2580000003 HC RX 258: Performed by: INTERNAL MEDICINE

## 2021-09-30 PROCEDURE — 2700000000 HC OXYGEN THERAPY PER DAY

## 2021-09-30 PROCEDURE — 1210000000 HC MED SURG R&B

## 2021-09-30 PROCEDURE — 36415 COLL VENOUS BLD VENIPUNCTURE: CPT

## 2021-09-30 PROCEDURE — 85379 FIBRIN DEGRADATION QUANT: CPT

## 2021-09-30 PROCEDURE — 94761 N-INVAS EAR/PLS OXIMETRY MLT: CPT

## 2021-09-30 PROCEDURE — 6360000002 HC RX W HCPCS: Performed by: STUDENT IN AN ORGANIZED HEALTH CARE EDUCATION/TRAINING PROGRAM

## 2021-09-30 PROCEDURE — 85025 COMPLETE CBC W/AUTO DIFF WBC: CPT

## 2021-09-30 PROCEDURE — 6370000000 HC RX 637 (ALT 250 FOR IP): Performed by: INTERNAL MEDICINE

## 2021-09-30 PROCEDURE — 86140 C-REACTIVE PROTEIN: CPT

## 2021-09-30 PROCEDURE — 6370000000 HC RX 637 (ALT 250 FOR IP): Performed by: STUDENT IN AN ORGANIZED HEALTH CARE EDUCATION/TRAINING PROGRAM

## 2021-09-30 PROCEDURE — 2500000003 HC RX 250 WO HCPCS: Performed by: STUDENT IN AN ORGANIZED HEALTH CARE EDUCATION/TRAINING PROGRAM

## 2021-09-30 PROCEDURE — 80053 COMPREHEN METABOLIC PANEL: CPT

## 2021-09-30 RX ORDER — DEXAMETHASONE SODIUM PHOSPHATE 10 MG/ML
6 INJECTION, SOLUTION INTRAMUSCULAR; INTRAVENOUS 2 TIMES DAILY
Status: DISCONTINUED | OUTPATIENT
Start: 2021-09-30 | End: 2021-10-01 | Stop reason: HOSPADM

## 2021-09-30 RX ADMIN — OXYCODONE HYDROCHLORIDE AND ACETAMINOPHEN 1500 MG: 500 TABLET ORAL at 14:54

## 2021-09-30 RX ADMIN — GABAPENTIN 300 MG: 300 CAPSULE ORAL at 09:45

## 2021-09-30 RX ADMIN — TRAZODONE HYDROCHLORIDE 50 MG: 50 TABLET ORAL at 20:24

## 2021-09-30 RX ADMIN — BUSPIRONE HYDROCHLORIDE 5 MG: 5 TABLET ORAL at 09:45

## 2021-09-30 RX ADMIN — ALBUTEROL SULFATE 2 PUFF: 90 AEROSOL, METERED RESPIRATORY (INHALATION) at 20:22

## 2021-09-30 RX ADMIN — Medication 6000 UNITS: at 09:45

## 2021-09-30 RX ADMIN — ZINC SULFATE 220 MG (50 MG) CAPSULE 50 MG: CAPSULE at 09:45

## 2021-09-30 RX ADMIN — RISPERIDONE 0.5 MG: 0.5 TABLET ORAL at 09:45

## 2021-09-30 RX ADMIN — ENOXAPARIN SODIUM 30 MG: 30 INJECTION SUBCUTANEOUS at 09:45

## 2021-09-30 RX ADMIN — DIVALPROEX SODIUM 500 MG: 500 TABLET, DELAYED RELEASE ORAL at 20:24

## 2021-09-30 RX ADMIN — BARICITINIB 4 MG: 2 TABLET, FILM COATED ORAL at 09:45

## 2021-09-30 RX ADMIN — GABAPENTIN 300 MG: 300 CAPSULE ORAL at 20:24

## 2021-09-30 RX ADMIN — DIVALPROEX SODIUM 500 MG: 500 TABLET, DELAYED RELEASE ORAL at 09:45

## 2021-09-30 RX ADMIN — FENOFIBRATE 160 MG: 160 TABLET ORAL at 09:45

## 2021-09-30 RX ADMIN — ALBUTEROL SULFATE 2 PUFF: 90 AEROSOL, METERED RESPIRATORY (INHALATION) at 07:45

## 2021-09-30 RX ADMIN — DIVALPROEX SODIUM 500 MG: 500 TABLET, DELAYED RELEASE ORAL at 14:54

## 2021-09-30 RX ADMIN — SODIUM CHLORIDE, PRESERVATIVE FREE 10 ML: 5 INJECTION INTRAVENOUS at 09:45

## 2021-09-30 RX ADMIN — METHYLPHENIDATE HYDROCHLORIDE 10 MG: 5 TABLET ORAL at 09:45

## 2021-09-30 RX ADMIN — METHYLPHENIDATE HYDROCHLORIDE 10 MG: 5 TABLET ORAL at 14:55

## 2021-09-30 RX ADMIN — FAMOTIDINE 40 MG: 10 INJECTION, SOLUTION INTRAVENOUS at 09:45

## 2021-09-30 RX ADMIN — BUSPIRONE HYDROCHLORIDE 5 MG: 5 TABLET ORAL at 14:55

## 2021-09-30 RX ADMIN — SODIUM CHLORIDE, PRESERVATIVE FREE 10 ML: 5 INJECTION INTRAVENOUS at 20:22

## 2021-09-30 RX ADMIN — ALBUTEROL SULFATE 2 PUFF: 90 AEROSOL, METERED RESPIRATORY (INHALATION) at 12:22

## 2021-09-30 RX ADMIN — ALBUTEROL SULFATE 2 PUFF: 90 AEROSOL, METERED RESPIRATORY (INHALATION) at 15:03

## 2021-09-30 RX ADMIN — BUDESONIDE AND FORMOTEROL FUMARATE DIHYDRATE 2 PUFF: 160; 4.5 AEROSOL RESPIRATORY (INHALATION) at 07:45

## 2021-09-30 RX ADMIN — BUDESONIDE AND FORMOTEROL FUMARATE DIHYDRATE 2 PUFF: 160; 4.5 AEROSOL RESPIRATORY (INHALATION) at 20:22

## 2021-09-30 RX ADMIN — GABAPENTIN 300 MG: 300 CAPSULE ORAL at 14:55

## 2021-09-30 RX ADMIN — OXYCODONE HYDROCHLORIDE AND ACETAMINOPHEN 1500 MG: 500 TABLET ORAL at 09:45

## 2021-09-30 RX ADMIN — RISPERIDONE 0.5 MG: 0.5 TABLET ORAL at 20:25

## 2021-09-30 RX ADMIN — FLUOXETINE HYDROCHLORIDE 10 MG: 10 CAPSULE ORAL at 09:45

## 2021-09-30 RX ADMIN — Medication 5 MG: at 20:23

## 2021-09-30 RX ADMIN — DEXAMETHASONE SODIUM PHOSPHATE 6 MG: 10 INJECTION, SOLUTION INTRAMUSCULAR; INTRAVENOUS at 18:20

## 2021-09-30 RX ADMIN — ENOXAPARIN SODIUM 30 MG: 30 INJECTION SUBCUTANEOUS at 20:23

## 2021-09-30 RX ADMIN — BUSPIRONE HYDROCHLORIDE 5 MG: 5 TABLET ORAL at 20:24

## 2021-09-30 RX ADMIN — OXYCODONE HYDROCHLORIDE AND ACETAMINOPHEN 1500 MG: 500 TABLET ORAL at 20:24

## 2021-09-30 RX ADMIN — GUAIFENESIN SYRUP AND DEXTROMETHORPHAN 5 ML: 100; 10 SYRUP ORAL at 21:27

## 2021-09-30 RX ADMIN — METHYLPREDNISOLONE SODIUM SUCCINATE 60 MG: 125 INJECTION, POWDER, FOR SOLUTION INTRAMUSCULAR; INTRAVENOUS at 05:22

## 2021-09-30 NOTE — PROGRESS NOTES
[facility]        Hospitalist Progress Note  9/30/2021 5:52 PM  Subjective:   Admit Date: 9/27/2021  PCP: David Rios    Chief Complaint: Oxygen desaturation        Subjective: No acute events overnight. Pleasantly confused. Denies any shortness of breath. Supplemental O2 weaned further since yesterday. Cumulative Hospital History: 59 y.o. female with a past medical history of multiple sclerosis, bipolar disorder, COPD, anxiety who presented to the ED from skilled nursing facility after she was noted to have desaturations. Patient is pleasantly confused at baseline (AAO x1, person). She was found to have COVID-19 with associated acute hypoxic respiratory failure and pneumonia. CTA PE study performed showing no evidence of pulmonary embolism. Patient initially required high flow O2 with 15 L on nonrebreather mask. O2 was weaned down to 3 L in first 24 hours, however subsequently had increased O2 requirements with increasing CRP. She was treated with corticosteroids and baricitinib. ROS: Unable to complete comprehensive ROS due to baseline mental status    ADULT DIET;  Dysphagia - Minced and Moist; Mildly Thick (Nectar)    Intake/Output Summary (Last 24 hours) at 9/30/2021 1752  Last data filed at 9/30/2021 1235  Gross per 24 hour   Intake 220 ml   Output 1400 ml   Net -1180 ml     Medications:   sodium chloride       Current Facility-Administered Medications   Medication Dose Route Frequency Provider Last Rate Last Admin    ascorbic acid (VITAMIN C) tablet 1,500 mg  1,500 mg Oral TID Dolores Jeans, MD   1,500 mg at 09/30/21 1454    famotidine (PEPCID) injection 40 mg  40 mg IntraVENous Daily Dolores Jeans, MD   40 mg at 09/30/21 0945    albuterol sulfate  (90 Base) MCG/ACT inhaler 2 puff  2 puff Inhalation 4x daily Dolores Jeans, MD   2 puff at 09/30/21 1503    baricitinib (OLUMIANT) tablet 4 mg  4 mg Oral Daily Dolores Jeans, MD   4 mg at 09/30/21 0945    methylPREDNISolone sodium (SOLU-MEDROL) injection 60 mg  60 mg IntraVENous Q6H Pam Hi MD   60 mg at 09/30/21 0522    sodium chloride flush 0.9 % injection 5-40 mL  5-40 mL IntraVENous 2 times per day Matthew Wise MD   10 mL at 09/30/21 0945    sodium chloride flush 0.9 % injection 5-40 mL  5-40 mL IntraVENous PRN Matthew Wise MD        0.9 % sodium chloride infusion  25 mL IntraVENous PRN Matthew Wise MD        enoxaparin (LOVENOX) injection 30 mg  30 mg SubCUTAneous BID Matthew Wise MD   30 mg at 09/30/21 0945    ondansetron (ZOFRAN-ODT) disintegrating tablet 4 mg  4 mg Oral Q8H PRN Matthew Wise MD        Or    ondansetron [facility]) injection 4 mg  4 mg IntraVENous Q6H PRN Matthew Wise MD        polyethylene glycol [facility]) packet 17 g  17 g Oral Daily PRN Matthew Wise MD        acetaminophen (TYLENOL) tablet 650 mg  650 mg Oral Q6H PRN Matthew iWse MD        Or    acetaminophen (TYLENOL) suppository 650 mg  650 mg Rectal Q6H PRN Matthew Wise MD        guaiFENesin-dextromethorphan (ROBITUSSIN DM) 100-10 MG/5ML syrup 5 mL  5 mL Oral Q4H PRN Matthew Wise MD        Vitamin D (CHOLECALCIFEROL) tablet 6,000 Units  6,000 Units Oral Daily Matthew Wise MD   6,000 Units at 09/30/21 0945    Followed by   Rogre Hilton ON 10/4/2021] Vitamin D (CHOLECALCIFEROL) tablet 2,000 Units  2,000 Units Oral Daily Matthew Wise MD        budesonide-formoterol [facility]) 160-4.5 MCG/ACT inhaler 2 puff  2 puff Inhalation BID Matthew Wise MD   2 puff at 09/30/21 0745    potassium chloride (KLOR-CON M) extended release tablet 40 mEq  40 mEq Oral PRN Matthew Wise MD        Or    potassium bicarb-citric acid (EFFER-K) effervescent tablet 40 mEq  40 mEq Oral PRN Matthew Wise MD        Or    potassium chloride 10 mEq/100 mL IVPB (Peripheral Line)  10 mEq IntraVENous PRN Matthew Wise MD        magnesium sulfate 1000 mg in dextrose 5% 100 mL IVPB  1,000 mg IntraVENous PRN Matthew Wise MD        busPIRone (BUSPAR) tablet 5 mg  5 mg Oral TID Natividad Staley MD   5 mg at 09/30/21 1455    cyclobenzaprine (FLEXERIL) tablet 10 mg  10 mg Oral TID PRN Natividad Staley MD        diphenhydrAMINE (BENADRYL) tablet 25 mg  25 mg Oral Q6H PRN Natividad Staley MD        divalproex (DEPAKOTE) DR tablet 500 mg  500 mg Oral TID Natividad Staley MD   500 mg at 09/30/21 1454    fenofibrate (TRIGLIDE) tablet 160 mg  160 mg Oral Daily Natividad Staley MD   160 mg at 09/30/21 0945    FLUoxetine (PROZAC) capsule 10 mg  10 mg Oral Daily Natividad Staley MD   10 mg at 09/30/21 0945    gabapentin (NEURONTIN) capsule 300 mg  300 mg Oral TID Natividad Staley MD   300 mg at 09/30/21 1455    glatiramer (COPAXONE) injection 20 mg  20 mg SubCUTAneous Daily Natividad Staley MD        HYDROcodone-acetaminophen [facility]) 7.5-325 MG per tablet 1 tablet  1 tablet Oral Q6H PRN Natividad Staley MD        levothyroxine (SYNTHROID) tablet 75 mcg  75 mcg Oral Daily Natividad Staley MD   75 mcg at 09/29/21 6544    methylphenidate (RITALIN) tablet 10 mg  10 mg Oral BID Natividad Staley MD   10 mg at 09/30/21 1455    risperiDONE (RISPERDAL) tablet 0.5 mg  0.5 mg Oral BID Natividad Staley MD   0.5 mg at 09/30/21 0945    traZODone (DESYREL) tablet 50 mg  50 mg Oral Nightly Natividad Staley MD   50 mg at 09/29/21 2023    0.9 % sodium chloride bolus  30 mL IntraVENous PRN Natividad Staley MD        melatonin disintegrating tablet 5 mg  5 mg Oral Nightly Natividad Staley MD   5 mg at 09/29/21 2023    zinc sulfate (ZINCATE) capsule 50 mg  50 mg Oral Daily Natividad Staley MD   50 mg at 09/30/21 0945        Labs:     Recent Labs     09/28/21  0627 09/29/21  0538 09/30/21  0412   WBC 5.7 11.9* 9.8   RBC 3.81* 3.82* 3.49*   HGB 13.0 12.9 11.8*   HCT 42.7 41.8 37.9   .1* 109.4* 108.6*   MCH 34.1* 33.8* 33.8*   MCHC 30.4* 30.9* 31.1*    304 290     Recent Labs     09/28/21  0627 09/29/21  0538 09/30/21  0412    142 142   K 4.7 4.9 4.7   ANIONGAP 5* 7 8    102 103   CO2 33* 33* 31*   BUN 18 19 21   CREATININE 0.4* 0.4* 0.3*   GLUCOSE 102 105 129*   CALCIUM 9.3 9.1 8.5*     No results for input(s): MG, PHOS in the last 72 hours. Recent Labs     09/28/21  0627 09/29/21  0538 09/30/21  0412   AST 20 21 22   ALT 7 8 6   BILITOT 0.5 0.6 0.6   ALKPHOS 77 84 69     ABGs:No results for input(s): PH, PO2, PCO2, HCO3, BE, O2SAT in the last 72 hours. Troponin T: No results for input(s): TROPONINI in the last 72 hours. INR:   Recent Labs     09/28/21  0556   INR 1.10     Lactic Acid: No results for input(s): LACTA in the last 72 hours. Objective:   Vitals: /83   Pulse 94   Temp 97.6 °F (36.4 °C) (Temporal)   Resp 20   Wt 135 lb (61.2 kg)   SpO2 94%   24HR INTAKE/OUTPUT:      Intake/Output Summary (Last 24 hours) at 9/30/2021 1752  Last data filed at 9/30/2021 1235  Gross per 24 hour   Intake 220 ml   Output 1400 ml   Net -1180 ml     Physical exam    General appearance: Alert, pleasantly confused  HEENT: Normocephalic, atraumatic  Lungs: No increased work of breathing, no intercostal retractions  Heart:  Normal rate, regular rhythm  Abdomen: Nondistended  Extremities:  Contractures noted, no clubbing or cyanosis  Neurologic: Alert, no dysarthria or aphasia  Skin: No lesions or rashes observed        Assessment and Plan:   Principal Problem:    Pneumonia due to COVID-19 virus  Active Problems:    Acute respiratory failure with hypoxia (HCC)    COVID-19    Hx of multiple sclerosis (HCC)    Bipolar disorder (HCC)    COPD (chronic obstructive pulmonary disease) (HCC)    Hyperlipidemia    Anxiety    Palliative care patient  Resolved Problems:    * No resolved hospital problems.  *    Acute hypoxemic respiratory failure due to COVID-19 pneumonia  Supplemental O2 as needed, goal SPO2 90% and above  Wean O2 as able  Continue corticosteroids, follow CRP  Anticoagulation with Lovenox, follow D-dimer  Continue baricitinib  Adjunctive therapy  Monitor CBC, CMP     Hypothyroidism: Synthroid.     HLD: Statin     Supportive management.      Advance Directive: Full Code    DVT prophylaxis: Lovenox    Discharge planning: TBD-likely discharge back to nursing facility tomorrow      Signed:  Bj Lu MD 9/30/2021 5:52 PM

## 2021-10-01 VITALS
OXYGEN SATURATION: 95 % | RESPIRATION RATE: 20 BRPM | WEIGHT: 135 LBS | DIASTOLIC BLOOD PRESSURE: 64 MMHG | TEMPERATURE: 96.9 F | HEART RATE: 96 BPM | SYSTOLIC BLOOD PRESSURE: 108 MMHG

## 2021-10-01 LAB
ALBUMIN SERPL-MCNC: 3 G/DL (ref 3.5–5.2)
ALP BLD-CCNC: 77 U/L (ref 35–104)
ALT SERPL-CCNC: 10 U/L (ref 5–33)
ANION GAP SERPL CALCULATED.3IONS-SCNC: 15 MMOL/L (ref 7–19)
AST SERPL-CCNC: 38 U/L (ref 5–32)
BASOPHILS ABSOLUTE: 0 K/UL (ref 0–0.2)
BASOPHILS RELATIVE PERCENT: 0 % (ref 0–1)
BILIRUB SERPL-MCNC: 0.7 MG/DL (ref 0.2–1.2)
BUN BLDV-MCNC: 25 MG/DL (ref 8–23)
C-REACTIVE PROTEIN: 2.58 MG/DL (ref 0–0.5)
CALCIUM SERPL-MCNC: 8.8 MG/DL (ref 8.8–10.2)
CHLORIDE BLD-SCNC: 104 MMOL/L (ref 98–111)
CO2: 23 MMOL/L (ref 22–29)
CREAT SERPL-MCNC: 0.4 MG/DL (ref 0.5–0.9)
D DIMER: <0.27 UG/ML FEU (ref 0–0.48)
EOSINOPHILS ABSOLUTE: 0 K/UL (ref 0–0.6)
EOSINOPHILS RELATIVE PERCENT: 0 % (ref 0–5)
GFR AFRICAN AMERICAN: >59
GFR NON-AFRICAN AMERICAN: >60
GLUCOSE BLD-MCNC: 111 MG/DL (ref 74–109)
HCT VFR BLD CALC: 44 % (ref 37–47)
HEMOGLOBIN: 13.1 G/DL (ref 12–16)
HYPOCHROMIA: ABNORMAL
IMMATURE GRANULOCYTES #: 0.7 K/UL
LYMPHOCYTES ABSOLUTE: 2.5 K/UL (ref 1.1–4.5)
LYMPHOCYTES RELATIVE PERCENT: 15 % (ref 20–40)
MACROCYTES: ABNORMAL
MCH RBC QN AUTO: 33.6 PG (ref 27–31)
MCHC RBC AUTO-ENTMCNC: 29.8 G/DL (ref 33–37)
MCV RBC AUTO: 112.8 FL (ref 81–99)
MONOCYTES ABSOLUTE: 1.5 K/UL (ref 0–0.9)
MONOCYTES RELATIVE PERCENT: 9 % (ref 0–10)
MYELOCYTE PERCENT: 3 %
NEUTROPHILS ABSOLUTE: 12.6 K/UL (ref 1.5–7.5)
NEUTROPHILS RELATIVE PERCENT: 73 % (ref 50–65)
PDW BLD-RTO: 13.9 % (ref 11.5–14.5)
PLATELET # BLD: 360 K/UL (ref 130–400)
PLATELET SLIDE REVIEW: ADEQUATE
PMV BLD AUTO: 10.2 FL (ref 9.4–12.3)
POLYCHROMASIA: ABNORMAL
POTASSIUM REFLEX MAGNESIUM: 4.6 MMOL/L (ref 3.5–5)
RBC # BLD: 3.9 M/UL (ref 4.2–5.4)
SODIUM BLD-SCNC: 142 MMOL/L (ref 136–145)
TOTAL PROTEIN: 6.6 G/DL (ref 6.6–8.7)
TOXIC GRANULATION: ABNORMAL
WBC # BLD: 16.6 K/UL (ref 4.8–10.8)

## 2021-10-01 PROCEDURE — 6360000002 HC RX W HCPCS: Performed by: INTERNAL MEDICINE

## 2021-10-01 PROCEDURE — 6370000000 HC RX 637 (ALT 250 FOR IP): Performed by: INTERNAL MEDICINE

## 2021-10-01 PROCEDURE — 94761 N-INVAS EAR/PLS OXIMETRY MLT: CPT

## 2021-10-01 PROCEDURE — 86140 C-REACTIVE PROTEIN: CPT

## 2021-10-01 PROCEDURE — 36415 COLL VENOUS BLD VENIPUNCTURE: CPT

## 2021-10-01 PROCEDURE — 85379 FIBRIN DEGRADATION QUANT: CPT

## 2021-10-01 PROCEDURE — 6360000002 HC RX W HCPCS: Performed by: STUDENT IN AN ORGANIZED HEALTH CARE EDUCATION/TRAINING PROGRAM

## 2021-10-01 PROCEDURE — 2580000003 HC RX 258: Performed by: INTERNAL MEDICINE

## 2021-10-01 PROCEDURE — 85025 COMPLETE CBC W/AUTO DIFF WBC: CPT

## 2021-10-01 PROCEDURE — 2500000003 HC RX 250 WO HCPCS: Performed by: STUDENT IN AN ORGANIZED HEALTH CARE EDUCATION/TRAINING PROGRAM

## 2021-10-01 PROCEDURE — 2700000000 HC OXYGEN THERAPY PER DAY

## 2021-10-01 PROCEDURE — 6370000000 HC RX 637 (ALT 250 FOR IP): Performed by: STUDENT IN AN ORGANIZED HEALTH CARE EDUCATION/TRAINING PROGRAM

## 2021-10-01 PROCEDURE — 80053 COMPREHEN METABOLIC PANEL: CPT

## 2021-10-01 RX ORDER — FLUOXETINE 10 MG/1
10 CAPSULE ORAL DAILY
Qty: 30 CAPSULE | Refills: 0 | Status: SHIPPED | OUTPATIENT
Start: 2021-10-01

## 2021-10-01 RX ORDER — HYDROCODONE BITARTRATE AND ACETAMINOPHEN 7.5; 325 MG/1; MG/1
1 TABLET ORAL EVERY 6 HOURS PRN
Qty: 12 TABLET | Refills: 0 | Status: ON HOLD | OUTPATIENT
Start: 2021-10-01 | End: 2021-10-10 | Stop reason: HOSPADM

## 2021-10-01 RX ORDER — BUDESONIDE AND FORMOTEROL FUMARATE DIHYDRATE 160; 4.5 UG/1; UG/1
2 AEROSOL RESPIRATORY (INHALATION) 2 TIMES DAILY
Qty: 10.2 G | Refills: 0 | Status: SHIPPED | OUTPATIENT
Start: 2021-10-01

## 2021-10-01 RX ORDER — ALBUTEROL SULFATE 90 UG/1
2 AEROSOL, METERED RESPIRATORY (INHALATION) EVERY 4 HOURS PRN
Qty: 18 G | Refills: 0 | Status: SHIPPED | OUTPATIENT
Start: 2021-10-01

## 2021-10-01 RX ORDER — DIVALPROEX SODIUM 250 MG/1
500 TABLET, DELAYED RELEASE ORAL 3 TIMES DAILY
Qty: 90 TABLET | Refills: 0 | Status: SHIPPED | OUTPATIENT
Start: 2021-10-01

## 2021-10-01 RX ORDER — CHOLECALCIFEROL (VITAMIN D3) 50 MCG
2000 TABLET ORAL DAILY
Qty: 30 TABLET | Refills: 0 | Status: SHIPPED | OUTPATIENT
Start: 2021-10-04

## 2021-10-01 RX ORDER — DEXAMETHASONE 2 MG/1
TABLET ORAL
Qty: 30 TABLET | Refills: 0 | Status: SHIPPED | OUTPATIENT
Start: 2021-10-01 | End: 2021-10-16

## 2021-10-01 RX ADMIN — ALBUTEROL SULFATE 2 PUFF: 90 AEROSOL, METERED RESPIRATORY (INHALATION) at 11:57

## 2021-10-01 RX ADMIN — DEXAMETHASONE SODIUM PHOSPHATE 6 MG: 10 INJECTION, SOLUTION INTRAMUSCULAR; INTRAVENOUS at 05:08

## 2021-10-01 RX ADMIN — FAMOTIDINE 40 MG: 10 INJECTION, SOLUTION INTRAVENOUS at 07:56

## 2021-10-01 RX ADMIN — ENOXAPARIN SODIUM 30 MG: 30 INJECTION SUBCUTANEOUS at 07:55

## 2021-10-01 RX ADMIN — SODIUM CHLORIDE, PRESERVATIVE FREE 10 ML: 5 INJECTION INTRAVENOUS at 07:55

## 2021-10-01 NOTE — DISCHARGE INSTR - COC
Continuity of Care Form    Patient Name: Dalton Harrison   :  1960  MRN:  [id number]    Admit date:  2021  Discharge date:  ***    Code Status Order: Full Code   Advance Directives:     Admitting Physician:  Giovanna Anton MD  PCP: Paola Lewis    Discharging Nurse: [facility] Unit/Room#: 7754/877-35  Discharging Unit Phone Number: ***    Emergency Contact:   Extended Emergency Contact Information  Primary Emergency Contact: Ramesh Magaña  Mobile Phone: [phone]  Relation: Brother/Sister  Secondary Emergency Contact: Ken Man  Mobile Phone: [phone]  Relation: Brother/Sister    Past Surgical History:  No past surgical history on file. Immunization History: There is no immunization history on file for this patient.     Active Problems:  Patient Active Problem List   Diagnosis Code    Pneumonia due to COVID-19 virus U07.1, J12.82    Acute respiratory failure with hypoxia (HCC) J96.01    COVID-19 U07.1    Hx of multiple sclerosis ([facility] [address] G35    Bipolar disorder ([facility] [address] F31.9    COPD (chronic obstructive pulmonary disease) ([facility] [address] J44.9    Hyperlipidemia E78.5    Anxiety F41.9    Palliative care patient Z51.5       Isolation/Infection:   Isolation          Droplet Plus        Patient Infection Status     Infection Onset Added Last Indicated Last Indicated By Review Planned Expiration Resolved Resolved By    COVID-19 21 SARS-CoV-2 NAAT (Rapid) 10/04/21 10/11/21      Resolved    COVID-19 Rule Out 21 SARS-CoV-2 NAAT (Rapid) (Ordered)   21 Rule-Out Test Resulted          Nurse Assessment:  Last Vital Signs: /64   Pulse 96   Temp 96.9 °F (36.1 °C) (Temporal)   Resp 20   Wt 135 lb (61.2 kg)   SpO2 95%     Last documented pain score (0-10 scale):    Last Weight:   Wt Readings from Last 1 Encounters:   21 135 lb (61.2 kg)     Mental Status:  disoriented    IV Access:  - None    Nursing Mobility/ADLs:  Walking signature: {Esignature:159787486}    PHYSICIAN SECTION    Prognosis: {Prognosis:8930987829}    Condition at Discharge: [place] Chelsy Mars Patient Condition:612467695}    Rehab Potential (if transferring to Rehab): {Prognosis:9867061513}    Recommended Labs or Other Treatments After Discharge: ***    Physician Certification: I certify the above information and transfer of Teresa Gutierrez  is necessary for the continuing treatment of the diagnosis listed and that she requires {Admit to Appropriate Level of Care:63580} for {GREATER/LESS:967796771} 30 days.      Update Admission H&P: {CHP DME Changes in GRBBK:919206406}    PHYSICIAN SIGNATURE:  {Esignature:892016174}

## 2021-10-01 NOTE — PROGRESS NOTES
Pulse ox alarming 84%. Pt had O2 completely pulled out of nose/off face. O2 put back on. Pulse ox up to 92%. Nurse explained again to pt that oxygen must be left on. Pt confused and arguing w/ nurse. Informed CN.  Electronically signed by Iris Castillo RN on 9/30/2021 at 11:40 PM

## 2021-10-01 NOTE — PROGRESS NOTES
Report was called to [facility] at 1300.     Electronically signed by Yuridia Sloan RN on 10/1/2021 at 1:40 PM

## 2021-10-01 NOTE — PROGRESS NOTES
Pulse ox alarming 82%. Pt had NC pulled out of nose and NRB pulled down around chin. O2 put back on and pulse up to 94%. Soft restraint applied to R wrist. Informed CN and CHM.  Electronically signed by Kervin Kaufman RN on 9/30/2021 at 11:43 PM

## 2021-10-01 NOTE — DISCHARGE SUMMARY
Discharge Summary    NAME: Steffen Beyer  :  1960  MRN:  [id number]    Admit date:  2021  Discharge date: 10/1/2021    Admitting Physician:  Josie Gross MD    Advance Directive: Full Code    Primary Care Physician:  Candance Portal    Discharge Diagnoses:     Pneumonia due to COVID-19 virus    Acute respiratory failure with hypoxia ([address]    COVID-19   multiple sclerosis ([address]    Bipolar disorder ([address]    COPD (chronic obstructive pulmonary disease) ([address]    Hyperlipidemia    Anxiety    Palliative care patient      Significant Diagnostic Studies:   XR CHEST PORTABLE    Result Date: 2021  EXAMINATION: Chest one view 2021 HISTORY: Covid positive. Shortness of breath. FINDINGS: Today's exam is compared to previous study of 2015. There is scoliosis of the thoracolumbar spine. There is basilar atelectasis. The disease within the left lower lobe including the lingular segment of the left upper lobe is more confluent worrisome for pneumonia. No effusion or free air is demonstrated. 1.. Expiratory chest with basilar atelectasis. There is more confluent disease within the left lower lobe as well as lingular segment of the left upper lobe suspicious for pneumonia. 2. Scoliosis of the thoracolumbar spine which is moderate to severe with a rotatory component. Signed by Dr Valerio Liter    Result Date: 2021  Examination. CTA PULMONARY W CONTRAST 2021 3:54 PM History: Shortness of breath. COVID positive. DLP: 645 mGycm. CT angiography of the chest is performed after intravenous contrast enhancement. The images are acquired in axial plane with subsequent reconstruction in coronal and sagittal plane. There is no previous study for comparison. The correlation made with chest radiograph dated 2020 1P This study is of very limited diagnostic value due to poor lung expansion and respiratory motion artifacts.  There are extensive streak artifacts produced by the bony structures of the upper extremity which are lying across the lower chest and upper abdomen. There is normal opacification of the pulmonary arteries and branches bilaterally. No filling defects in the opacified pulmonary arterial bed. Atheromatous changes thoracic aorta are seen. No aneurysmal dilatation or dissection. There is no evidence of mediastinal or hilar mass or lymphadenopathy. The limited visualized soft tissues of the neck are unremarkable. There is no axillary lymphadenopathy. The lungs are poorly expanded with superimposed respiratory motion artifacts. There is multifocal groundglass opacity/infiltrate more severely in the left upper and lower lobes. There is mild elevation right diaphragm. There is an area of consolidation left lower lobe with air bronchogram a left basal pleural effusion. The limited visualized liver and spleen are unremarkable. There are nonobstructing left renal calculus. The adrenal glands are unremarkable. Limited visualized pancreas is unremarkable. A very limited diagnostic study due to poor lung expansion and respiratory motion artifacts. No evidence of pulmonary embolism. No aortic aneurysm or dissection. Bilateral multifocal patchy groundglass infiltrate represent an acute inflammatory/infectious process. Left lower lobar consolidation and small left basal pleural effusion. Nonobstructing left renal calculus. Signed by Dr Nasreen Levin:   CBC:   Recent Labs     09/29/21  0538 09/30/21  0412 10/01/21  0305   WBC 11.9* 9.8 16.6*   HGB 12.9 11.8* 13.1    290 360     BMP:    Recent Labs     09/29/21  0538 09/30/21  0412 10/01/21  0305    142 142   K 4.9 4.7 4.6    103 104   CO2 33* 31* 23   BUN 19 21 25*   CREATININE 0.4* 0.3* 0.4*   GLUCOSE 105 129* 111*             Hospital Course:    59 y. o. female with a past medical history of multiple sclerosis, bipolar disorder, COPD, anxiety who presented to the ED from skilled nursing facility after she was noted to have desaturations.  Patient is pleasantly confused at baseline (AAO x1, person). Tea Rodriguez was found to have COVID-19 with associated acute hypoxic respiratory failure and pneumonia. CTA PE study performed showing no evidence of pulmonary embolism. Patient initially required high flow O2 with 15 L on nonrebreather mask. O2 was weaned down to 3 L in first 24 hours, however subsequently had increased O2 requirements with increasing CRP. She was treated with corticosteroids and baricitinib. Patient improved from respiratory standpoint over hospital course. Home O2 assessment completed required 2 L/minute.   Stable for discharge back to nursing facility to complete course of dexamethasone, low-dose Eliquis to take x30 days to reduce thrombotic risk in setting of Covid, as well as bronchodilators.           Physical Exam:  Vital Signs: /64   Pulse 96   Temp 96.9 °F (36.1 °C) (Temporal)   Resp 20   Wt 135 lb (61.2 kg)   SpO2 95%   General appearance: Alert, pleasantly confused  HEENT: Normocephalic, atraumatic  Lungs: No increased work of breathing, no intercostal retractions  Heart:  Normal rate, regular rhythm  Abdomen: Nondistended  Extremities:  Contractures noted, no clubbing or cyanosis  Neurologic: Alert, no dysarthria or aphasia  Skin: No lesions or rashes observed    Discharge Medications:       [facility][id number]    Printed on:10/01/21 1210   Medication Information                      albuterol sulfate  (90 Base) MCG/ACT inhaler  Inhale 2 puffs into the lungs every 4 hours as needed for Wheezing or Shortness of Breath             apixaban (ELIQUIS) 2.5 MG TABS tablet  Take 1 tablet by mouth 2 times daily             Biotin 1 MG CAPS  Take by mouth             budesonide-formoterol (SYMBICORT) 160-4.5 MCG/ACT AERO  Inhale 2 puffs into the lungs 2 times daily             busPIRone (BUSPAR) 5 MG tablet  Take 5 mg by mouth 2 times daily              cadexomer iodine (IODOSORB) 0.9 % gel  Apply topically every 48 hours Apply topically daily as needed. calcium carbonate (TUMS) 500 MG chewable tablet  Take 1 tablet by mouth 3 times daily             calcium-vitamin D (OSCAL-500) 500-200 MG-UNIT per tablet  Take 1 tablet by mouth 2 times daily Calcium carbonate-vitamin D3 600mg-400unit             camphor-menthol (SARNA) 0.5-0.5 % lotion  Apply topically as needed for Itching Apply topically as needed. cyclobenzaprine (FLEXERIL) 10 MG tablet  Take 10 mg by mouth 3 times daily as needed for Muscle spasms             dexamethasone (DECADRON) 2 MG tablet  Take 3 tablets by mouth daily (with breakfast) for 5 days, THEN 2 tablets daily (with breakfast) for 5 days, THEN 1 tablet daily (with breakfast) for 5 days. diphenhydrAMINE (BENADRYL) 25 MG capsule  Take 25 mg by mouth every 6 hours as needed for Itching             divalproex (DEPAKOTE) 250 MG DR tablet  Take 2 tablets by mouth 3 times daily             docusate sodium (COLACE) 100 MG capsule  Take 100 mg by mouth daily             famotidine (PEPCID) 20 MG tablet  Take 20 mg by mouth daily              fenofibrate (TRICOR) 145 MG tablet  Take 145 mg by mouth daily             FLUoxetine (PROZAC) 10 MG capsule  Take 1 capsule by mouth daily             gabapentin (NEURONTIN) 300 MG capsule  Take 300 mg by mouth 3 times daily. glatiramer (COPAXONE) 20 MG/ML injection  Inject into the skin daily             HYDROcodone-acetaminophen (NORCO) 7.5-325 MG per tablet  Take 1 tablet by mouth every 6 hours as needed for Pain for up to 3 days. ibuprofen (ADVIL;MOTRIN) 600 MG tablet  Take 600 mg by mouth every 12 hours as needed for Pain             levothyroxine (SYNTHROID) 75 MCG tablet  Take 75 mcg by mouth Daily             menthol-zinc oxide (CALMOSEPTINE) 0.44-20.625 % OINT ointment  Apply topically daily Max 30 ml per day. methylphenidate (RITALIN) 10 MG tablet  Take 10 mg by mouth 2 times daily. risperiDONE (RISPERDAL) 0.5 MG tablet  Take 0.25 mg by mouth daily              traZODone (DESYREL) 50 MG tablet  Take 50 mg by mouth nightly             Vitamin D (CHOLECALCIFEROL) 50 MCG (2000 UT) TABS tablet  Take 1 tablet by mouth daily                 Discharge Instructions: Follow up with Salvatore Roach within a week. Take medications as directed. Resume activity as tolerated. Diet: ADULT DIET; Dysphagia - Minced and Moist; Mildly Thick (Nectar)     Disposition: Patient is medically stable and will be discharged back to nursing facility. Time spent on discharge 40 minutes.       Signed:  Renato Dudley MD  10/1/2021 12:10 PM

## 2021-10-01 NOTE — PROGRESS NOTES
SpO2 on 6 l/m nasal cannula - 100%  SpO2 on room air at rest - 87%  SpO2 after placing patient back on nasal cannula @ 2 l/m - 97%    Patient unable to ambulate.

## 2021-10-01 NOTE — PROGRESS NOTES
HFNC gradually increased to 15L, but pulse ox remains < 90%. 100% NRB added with pulse ox coming up to 92 - 95%. Pt continues to pull at oxygen. Nurse reminded pt to keep O2 on, but pt argumentative w/ nurse.  Electronically signed by Gabriela Kelsey RN on 9/30/2021 at 11:37 PM

## 2021-10-01 NOTE — PROGRESS NOTES
Pulse ox alarming 84% on 3LNC. Pt confused and pulling at nasal cannula. Nurse at bedside - explained to pt importance of keeping O2 on.  Electronically signed by Lucille Menendez RN on 9/30/2021 at 11:33 PM

## 2021-10-01 NOTE — CARE COORDINATION
Patient can return to [facility] today, 10/1/2021.   Electronically signed by Juan Manuel Guy RN on 10/1/2021 at 12:42 PM

## 2021-10-04 ENCOUNTER — HOSPITAL ENCOUNTER (INPATIENT)
Age: 61
LOS: 6 days | Discharge: NURSING, REHAB OR LONG-TERM CARE | DRG: 871 | End: 2021-10-10
Attending: EMERGENCY MEDICINE | Admitting: INTERNAL MEDICINE
Payer: MEDICAID

## 2021-10-04 ENCOUNTER — APPOINTMENT (OUTPATIENT)
Dept: GENERAL RADIOLOGY | Age: 61
DRG: 871 | End: 2021-10-04
Payer: MEDICAID

## 2021-10-04 DIAGNOSIS — N30.00 ACUTE CYSTITIS WITHOUT HEMATURIA: ICD-10-CM

## 2021-10-04 DIAGNOSIS — Z51.5 PALLIATIVE CARE PATIENT: ICD-10-CM

## 2021-10-04 DIAGNOSIS — G89.29 OTHER CHRONIC PAIN: ICD-10-CM

## 2021-10-04 DIAGNOSIS — J96.01 ACUTE HYPOXEMIC RESPIRATORY FAILURE DUE TO COVID-19 (HCC): Primary | ICD-10-CM

## 2021-10-04 DIAGNOSIS — U07.1 ACUTE HYPOXEMIC RESPIRATORY FAILURE DUE TO COVID-19 (HCC): Primary | ICD-10-CM

## 2021-10-04 PROBLEM — J96.00 ACUTE RESPIRATORY FAILURE DUE TO COVID-19 (HCC): Status: ACTIVE | Noted: 2021-10-04

## 2021-10-04 LAB
ALBUMIN SERPL-MCNC: 3 G/DL (ref 3.5–5.2)
ALP BLD-CCNC: 69 U/L (ref 35–104)
ALT SERPL-CCNC: 12 U/L (ref 5–33)
ANION GAP SERPL CALCULATED.3IONS-SCNC: 11 MMOL/L (ref 7–19)
AST SERPL-CCNC: 22 U/L (ref 5–32)
BACTERIA: ABNORMAL /HPF
BASE EXCESS ARTERIAL: 2.2 MMOL/L (ref -2–2)
BASOPHILS ABSOLUTE: 0.1 K/UL (ref 0–0.2)
BASOPHILS RELATIVE PERCENT: 0.6 % (ref 0–1)
BILIRUB SERPL-MCNC: 0.8 MG/DL (ref 0.2–1.2)
BILIRUBIN URINE: ABNORMAL
BLOOD, URINE: ABNORMAL
BUN BLDV-MCNC: 14 MG/DL (ref 8–23)
CALCIUM SERPL-MCNC: 9.1 MG/DL (ref 8.8–10.2)
CARBOXYHEMOGLOBIN ARTERIAL: 1.8 % (ref 0–5)
CHLORIDE BLD-SCNC: 104 MMOL/L (ref 98–111)
CLARITY: ABNORMAL
CO2: 25 MMOL/L (ref 22–29)
COLOR: ABNORMAL
CREAT SERPL-MCNC: 0.3 MG/DL (ref 0.5–0.9)
D DIMER: 0.33 UG/ML FEU (ref 0–0.48)
EKG P AXIS: 85 DEGREES
EKG P-R INTERVAL: 118 MS
EKG Q-T INTERVAL: 302 MS
EKG QRS DURATION: 70 MS
EKG QTC CALCULATION (BAZETT): 417 MS
EKG T AXIS: 57 DEGREES
EOSINOPHILS ABSOLUTE: 0 K/UL (ref 0–0.6)
EOSINOPHILS RELATIVE PERCENT: 0 % (ref 0–5)
EPITHELIAL CELLS, UA: ABNORMAL /HPF
GFR AFRICAN AMERICAN: >59
GFR NON-AFRICAN AMERICAN: >60
GLUCOSE BLD-MCNC: 101 MG/DL (ref 74–109)
GLUCOSE URINE: NEGATIVE MG/DL
HCO3 ARTERIAL: 27.3 MMOL/L (ref 22–26)
HCT VFR BLD CALC: 41.9 % (ref 37–47)
HEMOGLOBIN, ART, EXTENDED: 13.7 G/DL (ref 12–16)
HEMOGLOBIN: 13.1 G/DL (ref 12–16)
HYALINE CASTS: ABNORMAL /LPF (ref 0–5)
IMMATURE GRANULOCYTES #: 0.6 K/UL
KETONES, URINE: NEGATIVE MG/DL
LACTIC ACID: 2.4 MMOL/L (ref 0.5–1.9)
LACTIC ACID: 2.9 MMOL/L (ref 0.5–1.9)
LEUKOCYTE ESTERASE, URINE: ABNORMAL
LYMPHOCYTES ABSOLUTE: 4.7 K/UL (ref 1.1–4.5)
LYMPHOCYTES RELATIVE PERCENT: 23.5 % (ref 20–40)
MCH RBC QN AUTO: 33.6 PG (ref 27–31)
MCHC RBC AUTO-ENTMCNC: 31.3 G/DL (ref 33–37)
MCV RBC AUTO: 107.4 FL (ref 81–99)
METHEMOGLOBIN ARTERIAL: 0.9 %
MONOCYTES ABSOLUTE: 1.9 K/UL (ref 0–0.9)
MONOCYTES RELATIVE PERCENT: 9.3 % (ref 0–10)
NEUTROPHILS ABSOLUTE: 12.6 K/UL (ref 1.5–7.5)
NEUTROPHILS RELATIVE PERCENT: 63.5 % (ref 50–65)
NITRITE, URINE: POSITIVE
O2 CONTENT ARTERIAL: 16.1 ML/DL
O2 SAT, ARTERIAL: 83.6 %
O2 THERAPY: ABNORMAL
PCO2 ARTERIAL: 43 MMHG (ref 35–45)
PDW BLD-RTO: 14 % (ref 11.5–14.5)
PH ARTERIAL: 7.41 (ref 7.35–7.45)
PH UA: 6.5 (ref 5–8)
PLATELET # BLD: 399 K/UL (ref 130–400)
PMV BLD AUTO: 10.4 FL (ref 9.4–12.3)
PO2 ARTERIAL: 48 MMHG (ref 80–100)
POTASSIUM SERPL-SCNC: 3.8 MMOL/L (ref 3.5–5)
POTASSIUM, WHOLE BLOOD: 3.7
PRO-BNP: 619 PG/ML (ref 0–900)
PROTEIN UA: 300 MG/DL
RBC # BLD: 3.9 M/UL (ref 4.2–5.4)
RBC UA: ABNORMAL /HPF (ref 0–2)
SODIUM BLD-SCNC: 140 MMOL/L (ref 136–145)
SPECIFIC GRAVITY UA: 1.01 (ref 1–1.03)
TOTAL PROTEIN: 6.9 G/DL (ref 6.6–8.7)
UROBILINOGEN, URINE: 1 E.U./DL
WBC # BLD: 19.9 K/UL (ref 4.8–10.8)
WBC UA: ABNORMAL /HPF (ref 0–5)

## 2021-10-04 PROCEDURE — 6370000000 HC RX 637 (ALT 250 FOR IP): Performed by: INTERNAL MEDICINE

## 2021-10-04 PROCEDURE — 36600 WITHDRAWAL OF ARTERIAL BLOOD: CPT

## 2021-10-04 PROCEDURE — 93005 ELECTROCARDIOGRAM TRACING: CPT | Performed by: EMERGENCY MEDICINE

## 2021-10-04 PROCEDURE — 96374 THER/PROPH/DIAG INJ IV PUSH: CPT

## 2021-10-04 PROCEDURE — 6360000002 HC RX W HCPCS: Performed by: EMERGENCY MEDICINE

## 2021-10-04 PROCEDURE — 6360000002 HC RX W HCPCS: Performed by: INTERNAL MEDICINE

## 2021-10-04 PROCEDURE — 87077 CULTURE AEROBIC IDENTIFY: CPT

## 2021-10-04 PROCEDURE — 2100000000 HC CCU R&B

## 2021-10-04 PROCEDURE — 94660 CPAP INITIATION&MGMT: CPT

## 2021-10-04 PROCEDURE — 85379 FIBRIN DEGRADATION QUANT: CPT

## 2021-10-04 PROCEDURE — 99285 EMERGENCY DEPT VISIT HI MDM: CPT

## 2021-10-04 PROCEDURE — 2700000000 HC OXYGEN THERAPY PER DAY

## 2021-10-04 PROCEDURE — 87040 BLOOD CULTURE FOR BACTERIA: CPT

## 2021-10-04 PROCEDURE — 87086 URINE CULTURE/COLONY COUNT: CPT

## 2021-10-04 PROCEDURE — 2580000003 HC RX 258: Performed by: INTERNAL MEDICINE

## 2021-10-04 PROCEDURE — 82803 BLOOD GASES ANY COMBINATION: CPT

## 2021-10-04 PROCEDURE — 81001 URINALYSIS AUTO W/SCOPE: CPT

## 2021-10-04 PROCEDURE — 51702 INSERT TEMP BLADDER CATH: CPT

## 2021-10-04 PROCEDURE — 85025 COMPLETE CBC W/AUTO DIFF WBC: CPT

## 2021-10-04 PROCEDURE — 83880 ASSAY OF NATRIURETIC PEPTIDE: CPT

## 2021-10-04 PROCEDURE — 2580000003 HC RX 258: Performed by: EMERGENCY MEDICINE

## 2021-10-04 PROCEDURE — 83605 ASSAY OF LACTIC ACID: CPT

## 2021-10-04 PROCEDURE — 80053 COMPREHEN METABOLIC PANEL: CPT

## 2021-10-04 PROCEDURE — 71045 X-RAY EXAM CHEST 1 VIEW: CPT

## 2021-10-04 PROCEDURE — 93010 ELECTROCARDIOGRAM REPORT: CPT | Performed by: INTERNAL MEDICINE

## 2021-10-04 PROCEDURE — 87186 SC STD MICRODIL/AGAR DIL: CPT

## 2021-10-04 PROCEDURE — 84132 ASSAY OF SERUM POTASSIUM: CPT

## 2021-10-04 PROCEDURE — 36415 COLL VENOUS BLD VENIPUNCTURE: CPT

## 2021-10-04 RX ORDER — BUSPIRONE HYDROCHLORIDE 5 MG/1
5 TABLET ORAL 2 TIMES DAILY
Status: DISCONTINUED | OUTPATIENT
Start: 2021-10-04 | End: 2021-10-10 | Stop reason: HOSPADM

## 2021-10-04 RX ORDER — CALCIUM CARBONATE 200(500)MG
1 TABLET,CHEWABLE ORAL 3 TIMES DAILY PRN
Status: DISCONTINUED | OUTPATIENT
Start: 2021-10-04 | End: 2021-10-10 | Stop reason: HOSPADM

## 2021-10-04 RX ORDER — FENOFIBRATE 160 MG/1
160 TABLET ORAL DAILY
Status: DISCONTINUED | OUTPATIENT
Start: 2021-10-04 | End: 2021-10-10 | Stop reason: HOSPADM

## 2021-10-04 RX ORDER — FAMOTIDINE 20 MG/1
20 TABLET, FILM COATED ORAL DAILY
Status: DISCONTINUED | OUTPATIENT
Start: 2021-10-04 | End: 2021-10-10 | Stop reason: HOSPADM

## 2021-10-04 RX ORDER — MENTHOL, UNSPECIFIED FORM 3.97 G/113.4G
GEL TOPICAL 2 TIMES DAILY PRN
COMMUNITY

## 2021-10-04 RX ORDER — DIPHENHYDRAMINE HCL 25 MG
25 TABLET ORAL EVERY 6 HOURS PRN
Status: DISCONTINUED | OUTPATIENT
Start: 2021-10-04 | End: 2021-10-10 | Stop reason: HOSPADM

## 2021-10-04 RX ORDER — CYCLOBENZAPRINE HCL 10 MG
10 TABLET ORAL 3 TIMES DAILY PRN
Status: DISCONTINUED | OUTPATIENT
Start: 2021-10-04 | End: 2021-10-10 | Stop reason: HOSPADM

## 2021-10-04 RX ORDER — FLUOXETINE 10 MG/1
10 CAPSULE ORAL DAILY
Status: DISCONTINUED | OUTPATIENT
Start: 2021-10-04 | End: 2021-10-10 | Stop reason: HOSPADM

## 2021-10-04 RX ORDER — DEXAMETHASONE SODIUM PHOSPHATE 10 MG/ML
6 INJECTION, SOLUTION INTRAMUSCULAR; INTRAVENOUS EVERY 24 HOURS
Status: DISCONTINUED | OUTPATIENT
Start: 2021-10-04 | End: 2021-10-10 | Stop reason: HOSPADM

## 2021-10-04 RX ORDER — TRAZODONE HYDROCHLORIDE 50 MG/1
50 TABLET ORAL NIGHTLY
Status: DISCONTINUED | OUTPATIENT
Start: 2021-10-04 | End: 2021-10-10 | Stop reason: HOSPADM

## 2021-10-04 RX ORDER — GABAPENTIN 300 MG/1
300 CAPSULE ORAL 3 TIMES DAILY
Status: DISCONTINUED | OUTPATIENT
Start: 2021-10-04 | End: 2021-10-10 | Stop reason: HOSPADM

## 2021-10-04 RX ORDER — VITAMIN B COMPLEX
2000 TABLET ORAL DAILY
Status: DISCONTINUED | OUTPATIENT
Start: 2021-10-04 | End: 2021-10-10 | Stop reason: HOSPADM

## 2021-10-04 RX ORDER — ALBUTEROL SULFATE 90 UG/1
2 AEROSOL, METERED RESPIRATORY (INHALATION) EVERY 4 HOURS PRN
Status: DISCONTINUED | OUTPATIENT
Start: 2021-10-04 | End: 2021-10-10 | Stop reason: HOSPADM

## 2021-10-04 RX ORDER — METHYLPHENIDATE HYDROCHLORIDE 5 MG/1
10 TABLET ORAL 2 TIMES DAILY
Status: DISCONTINUED | OUTPATIENT
Start: 2021-10-04 | End: 2021-10-10 | Stop reason: HOSPADM

## 2021-10-04 RX ORDER — BUDESONIDE AND FORMOTEROL FUMARATE DIHYDRATE 160; 4.5 UG/1; UG/1
2 AEROSOL RESPIRATORY (INHALATION) 2 TIMES DAILY
Status: DISCONTINUED | OUTPATIENT
Start: 2021-10-04 | End: 2021-10-10 | Stop reason: HOSPADM

## 2021-10-04 RX ORDER — DOCUSATE SODIUM 100 MG/1
100 CAPSULE, LIQUID FILLED ORAL DAILY
Status: DISCONTINUED | OUTPATIENT
Start: 2021-10-04 | End: 2021-10-10 | Stop reason: HOSPADM

## 2021-10-04 RX ORDER — AMMONIUM LACTATE 12 G/100G
LOTION TOPICAL PRN
COMMUNITY

## 2021-10-04 RX ORDER — DIVALPROEX SODIUM 500 MG/1
500 TABLET, DELAYED RELEASE ORAL 3 TIMES DAILY
Status: DISCONTINUED | OUTPATIENT
Start: 2021-10-04 | End: 2021-10-07

## 2021-10-04 RX ORDER — GLATIRAMER ACETATE 20 MG/ML
20 INJECTION, SOLUTION SUBCUTANEOUS DAILY
Status: DISCONTINUED | OUTPATIENT
Start: 2021-10-04 | End: 2021-10-10 | Stop reason: HOSPADM

## 2021-10-04 RX ORDER — ACETAMINOPHEN 325 MG/1
650 TABLET ORAL EVERY 6 HOURS PRN
Status: DISCONTINUED | OUTPATIENT
Start: 2021-10-04 | End: 2021-10-10 | Stop reason: HOSPADM

## 2021-10-04 RX ORDER — IPRATROPIUM BROMIDE AND ALBUTEROL SULFATE 2.5; .5 MG/3ML; MG/3ML
1 SOLUTION RESPIRATORY (INHALATION) EVERY 4 HOURS
Status: DISCONTINUED | OUTPATIENT
Start: 2021-10-04 | End: 2021-10-06

## 2021-10-04 RX ORDER — ACETAMINOPHEN 650 MG/1
650 SUPPOSITORY RECTAL EVERY 6 HOURS PRN
Status: DISCONTINUED | OUTPATIENT
Start: 2021-10-04 | End: 2021-10-10 | Stop reason: HOSPADM

## 2021-10-04 RX ADMIN — MEROPENEM 1000 MG: 1 INJECTION, POWDER, FOR SOLUTION INTRAVENOUS at 21:17

## 2021-10-04 RX ADMIN — DOCUSATE SODIUM 100 MG: 100 CAPSULE ORAL at 12:16

## 2021-10-04 RX ADMIN — ALBUTEROL SULFATE 2 PUFF: 90 AEROSOL, METERED RESPIRATORY (INHALATION) at 12:15

## 2021-10-04 RX ADMIN — GABAPENTIN 300 MG: 300 CAPSULE ORAL at 20:35

## 2021-10-04 RX ADMIN — VANCOMYCIN HYDROCHLORIDE 1000 MG: 10 INJECTION, POWDER, LYOPHILIZED, FOR SOLUTION INTRAVENOUS at 20:02

## 2021-10-04 RX ADMIN — APIXABAN 2.5 MG: 2.5 TABLET, FILM COATED ORAL at 12:15

## 2021-10-04 RX ADMIN — BUDESONIDE AND FORMOTEROL FUMARATE DIHYDRATE 2 PUFF: 160; 4.5 AEROSOL RESPIRATORY (INHALATION) at 12:15

## 2021-10-04 RX ADMIN — LEVOTHYROXINE SODIUM 75 MCG: 25 TABLET ORAL at 12:14

## 2021-10-04 RX ADMIN — DIVALPROEX SODIUM 500 MG: 500 TABLET, DELAYED RELEASE ORAL at 12:14

## 2021-10-04 RX ADMIN — Medication 1 TABLET: at 12:14

## 2021-10-04 RX ADMIN — DIVALPROEX SODIUM 500 MG: 500 TABLET, DELAYED RELEASE ORAL at 20:35

## 2021-10-04 RX ADMIN — BUDESONIDE AND FORMOTEROL FUMARATE DIHYDRATE 2 PUFF: 160; 4.5 AEROSOL RESPIRATORY (INHALATION) at 21:13

## 2021-10-04 RX ADMIN — METHYLPHENIDATE HYDROCHLORIDE 10 MG: 5 TABLET ORAL at 12:14

## 2021-10-04 RX ADMIN — Medication 2000 UNITS: at 12:14

## 2021-10-04 RX ADMIN — BUSPIRONE HYDROCHLORIDE 5 MG: 5 TABLET ORAL at 12:14

## 2021-10-04 RX ADMIN — DEXAMETHASONE SODIUM PHOSPHATE 6 MG: 10 INJECTION INTRAMUSCULAR; INTRAVENOUS at 12:13

## 2021-10-04 RX ADMIN — PIPERACILLIN AND TAZOBACTAM 3375 MG: 3; .375 INJECTION, POWDER, LYOPHILIZED, FOR SOLUTION INTRAVENOUS at 12:06

## 2021-10-04 RX ADMIN — ALBUTEROL SULFATE 2 PUFF: 90 AEROSOL, METERED RESPIRATORY (INHALATION) at 21:14

## 2021-10-04 RX ADMIN — APIXABAN 2.5 MG: 2.5 TABLET, FILM COATED ORAL at 20:35

## 2021-10-04 RX ADMIN — CEFTRIAXONE 1000 MG: 1 INJECTION, POWDER, FOR SOLUTION INTRAMUSCULAR; INTRAVENOUS at 07:25

## 2021-10-04 RX ADMIN — FLUOXETINE 10 MG: 10 CAPSULE ORAL at 12:14

## 2021-10-04 RX ADMIN — BUSPIRONE HYDROCHLORIDE 5 MG: 5 TABLET ORAL at 20:35

## 2021-10-04 RX ADMIN — TRAZODONE HYDROCHLORIDE 50 MG: 50 TABLET ORAL at 20:35

## 2021-10-04 RX ADMIN — GABAPENTIN 300 MG: 300 CAPSULE ORAL at 12:14

## 2021-10-04 RX ADMIN — FENOFIBRATE 160 MG: 160 TABLET ORAL at 12:14

## 2021-10-04 RX ADMIN — Medication 1 TABLET: at 20:35

## 2021-10-04 RX ADMIN — PIPERACILLIN AND TAZOBACTAM 3375 MG: 3; .375 INJECTION, POWDER, LYOPHILIZED, FOR SOLUTION INTRAVENOUS at 16:25

## 2021-10-04 RX ADMIN — FAMOTIDINE 20 MG: 20 TABLET ORAL at 12:14

## 2021-10-04 RX ADMIN — VANCOMYCIN HYDROCHLORIDE 1750 MG: 10 INJECTION, POWDER, LYOPHILIZED, FOR SOLUTION INTRAVENOUS at 13:47

## 2021-10-04 ASSESSMENT — PAIN SCALES - GENERAL
PAINLEVEL_OUTOF10: 0

## 2021-10-04 NOTE — H&P
[address] [facility] [address]    Patient: Ramsey Alexander   : 1960   MRN: [id number]  Code Status: Full Code  PCP: Vadim Joyce  Date of Service: 10/4/2021    Chief Complaint:   Shortness of breath    History of Present Illness:   77-year-old female with past medical history as listed below presented to ER with shortness of breath. Patient was discharged from this facility on 10/1/2021 after being treated for COVID-19 pneumonia. ECF staff reports noncompliance with supplemental oxygen patient was discharged on. Patient placed on BiPAP by ERP. Admitted to COVID-19 critical care unit. Review of Systems:   Review of systems not obtained due to patient factors. Past Medical History:     Past Medical History:   Diagnosis Date    Anxiety     Arthritis     Bipolar 1 disorder [facility]     Borderline personality disorder ([facility]     COPD (chronic obstructive pulmonary disease) (HCC)     GERD (gastroesophageal reflux disease)     Hyperlipidemia     Hypothyroidism     [facility]     Palliative care patient 2021    Vitamin D deficiency        Past Surgical History:   History reviewed. No pertinent surgical history. Family History:   History reviewed. No pertinent family history.      Social History:     Social History     Socioeconomic History    Marital status: Single     Spouse name: None    Number of children: None    Years of education: None    Highest education level: None   Occupational History    None   Tobacco Use    Smoking status: Former Smoker    Smokeless tobacco: Never Used   Substance and Sexual Activity    Alcohol use: Not Currently    Drug use: Not Currently    Sexual activity: None   Other Topics Concern    None   Social History Narrative    None     Social Determinants of Health     Financial Resource Strain:     Difficulty of Paying Living Expenses:    Food Insecurity:     Worried About Running Out of Food in the Last Year:     Ran Out of Food in the Last Year:    Transportation Needs:     Lack of Transportation (Medical):  Lack of Transportation (Non-Medical):    Physical Activity:     Days of Exercise per Week:     Minutes of Exercise per Session:    Stress:     Feeling of Stress :    Social Connections:     Frequency of Communication with Friends and Family:     Frequency of Social Gatherings with Friends and Family:     Attends Latter-day Services:     Active Member of Clubs or Organizations:     Attends Club or Organization Meetings:     Marital Status:    Intimate Partner Violence:     Fear of Current or Ex-Partner:     Emotionally Abused:     Physically Abused:     Sexually Abused:      Prior to Admission Medications:   Medications Prior to Admission: Arginine 500 MG CAPS, Take 500 mg by mouth daily  ammonium lactate (LAC-HYDRIN) 12 % lotion, Apply topically as needed for Dry Skin Apply topically as needed. Camphor-Menthol (ARCTIC RELIEF PAIN RELIEVING) 0.2-3.5 % GEL, Apply topically 2 times daily as needed  HYDROcodone-acetaminophen (NORCO) 7.5-325 MG per tablet, Take 1 tablet by mouth every 6 hours as needed for Pain for up to 3 days. albuterol sulfate  (90 Base) MCG/ACT inhaler, Inhale 2 puffs into the lungs every 4 hours as needed for Wheezing or Shortness of Breath  budesonide-formoterol (SYMBICORT) 160-4.5 MCG/ACT AERO, Inhale 2 puffs into the lungs 2 times daily  apixaban (ELIQUIS) 2.5 MG TABS tablet, Take 1 tablet by mouth 2 times daily  Vitamin D (CHOLECALCIFEROL) 50 MCG (2000 UT) TABS tablet, Take 1 tablet by mouth daily  dexamethasone (DECADRON) 2 MG tablet, Take 3 tablets by mouth daily (with breakfast) for 5 days, THEN 2 tablets daily (with breakfast) for 5 days, THEN 1 tablet daily (with breakfast) for 5 days.   divalproex (DEPAKOTE) 250 MG DR tablet, Take 2 tablets by mouth 3 times daily  FLUoxetine (PROZAC) 10 MG capsule, Take 1 capsule by mouth daily  diphenhydrAMINE (BENADRYL) 25 MG capsule, Take 25 mg by mouth every 6 hours as needed for Itching  Biotin 1 MG CAPS, Take by mouth  busPIRone (BUSPAR) 5 MG tablet, Take 5 mg by mouth 2 times daily   glatiramer (COPAXONE) 20 MG/ML injection, Inject 20 mg into the skin daily   cyclobenzaprine (FLEXERIL) 10 MG tablet, Take 10 mg by mouth 3 times daily as needed for Muscle spasms  famotidine (PEPCID) 20 MG tablet, Take 20 mg by mouth daily   fenofibrate (TRICOR) 145 MG tablet, Take 145 mg by mouth daily  gabapentin (NEURONTIN) 300 MG capsule, Take 300 mg by mouth 3 times daily. levothyroxine (SYNTHROID) 75 MCG tablet, Take 75 mcg by mouth Daily  methylphenidate (RITALIN) 10 MG tablet, Take 10 mg by mouth 2 times daily. traZODone (DESYREL) 50 MG tablet, Take 50 mg by mouth nightly  calcium carbonate (TUMS) 500 MG chewable tablet, Take 1 tablet by mouth 3 times daily as needed   calcium-vitamin D (OSCAL-500) 500-200 MG-UNIT per tablet, Take 1 tablet by mouth 2 times daily Calcium carbonate-vitamin D3 600mg-400unit  docusate sodium (COLACE) 100 MG capsule, Take 100 mg by mouth daily  ibuprofen (ADVIL;MOTRIN) 600 MG tablet, Take 600 mg by mouth every 12 hours as needed for Pain  cadexomer iodine (IODOSORB) 0.9 % gel, Apply topically daily Apply topically daily as needed.       Allergies:   No Known Allergies      Physical Exam:   BP (!) 117/37   Pulse 95   Temp 97.4 °F (36.3 °C) (Axillary)   Resp 19   Ht 5' 5\" (1.651 m)   Wt 151 lb (68.5 kg)   SpO2 92%   BMI 25.13 kg/m²     In an effort to reduce COVID-19 exposure, patient seen from doorway and case discussed in detail with unit staff    Recent Results (from the past 72 hour(s))   Brain Natriuretic Peptide    Collection Time: 10/04/21  4:55 AM   Result Value Ref Range    Pro- 0 - 900 pg/mL   CBC Auto Differential    Collection Time: 10/04/21  4:55 AM   Result Value Ref Range    WBC 19.9 (H) 4.8 - 10.8 K/uL    RBC 3.90 (L) 4.20 - 5.40 M/uL    Hemoglobin 13.1 12.0 - 16.0 g/dL Hematocrit 41.9 37.0 - 47.0 %    .4 (H) 81.0 - 99.0 fL    MCH 33.6 (H) 27.0 - 31.0 pg    MCHC 31.3 (L) 33.0 - 37.0 g/dL    RDW 14.0 11.5 - 14.5 %    Platelets 363 198 - 769 K/uL    MPV 10.4 9.4 - 12.3 fL    Neutrophils % 63.5 50.0 - 65.0 %    Lymphocytes % 23.5 20.0 - 40.0 %    Monocytes % 9.3 0.0 - 10.0 %    Eosinophils % 0.0 0.0 - 5.0 %    Basophils % 0.6 0.0 - 1.0 %    Neutrophils Absolute 12.6 (H) 1.5 - 7.5 K/uL    Immature Granulocytes # 0.6 K/uL    Lymphocytes Absolute 4.7 (H) 1.1 - 4.5 K/uL    Monocytes Absolute 1.90 (H) 0.00 - 0.90 K/uL    Eosinophils Absolute 0.00 0.00 - 0.60 K/uL    Basophils Absolute 0.10 0.00 - 0.20 K/uL   Comprehensive Metabolic Panel    Collection Time: 10/04/21  4:55 AM   Result Value Ref Range    Sodium 140 136 - 145 mmol/L    Potassium 3.8 3.5 - 5.0 mmol/L    Chloride 104 98 - 111 mmol/L    CO2 25 22 - 29 mmol/L    Anion Gap 11 7 - 19 mmol/L    Glucose 101 74 - 109 mg/dL    BUN 14 8 - 23 mg/dL    CREATININE 0.3 (L) 0.5 - 0.9 mg/dL    GFR Non-African American >60 >60    GFR African American >59 >59    Calcium 9.1 8.8 - 10.2 mg/dL    Total Protein 6.9 6.6 - 8.7 g/dL    Albumin 3.0 (L) 3.5 - 5.2 g/dL    Total Bilirubin 0.8 0.2 - 1.2 mg/dL    Alkaline Phosphatase 69 35 - 104 U/L    ALT 12 5 - 33 U/L    AST 22 5 - 32 U/L   Lactic Acid, Plasma    Collection Time: 10/04/21  4:55 AM   Result Value Ref Range    Lactic Acid 2.9 (HH) 0.5 - 1.9 mmol/L   EKG 12 Lead    Collection Time: 10/04/21  5:03 AM   Result Value Ref Range    P-R Interval 118 ms    QRS Duration 70 ms    Q-T Interval 302 ms    QTc Calculation (Bazett) 417 ms    P Axis 85 degrees    T Axis 57 degrees   Urinalysis Reflex to Culture    Collection Time: 10/04/21  5:12 AM    Specimen: Urine, clean catch   Result Value Ref Range    Color, UA ORANGE (A) Straw/Yellow    Clarity, UA TURBID (A) Clear    Glucose, Ur Negative Negative mg/dL    Bilirubin Urine SMALL (A) Negative    Ketones, Urine Negative Negative mg/dL Specific Gravity, UA 1.013 1.005 - 1.030    Blood, Urine LARGE (A) Negative    pH, UA 6.5 5.0 - 8.0    Protein,  Negative mg/dL    Urobilinogen, Urine 1.0 <2.0 E.U./dL    Nitrite, Urine POSITIVE (A) Negative    Leukocyte Esterase, Urine LARGE (A) Negative   Microscopic Urinalysis    Collection Time: 10/04/21  5:12 AM   Result Value Ref Range    Hyaline Casts, UA 3-5 0 - 5 /LPF    WBC, UA TNTC (A) 0 - 5 /HPF    RBC, UA TNTC (A) 0 - 2 /HPF    Epithelial Cells, UA 5-10 /HPF    Bacteria, UA 4+ (A) None Seen /HPF   BLOOD GAS, ARTERIAL    Collection Time: 10/04/21  5:14 AM   Result Value Ref Range    pH, Arterial 7.410 7.350 - 7.450    pCO2, Arterial 43.0 35.0 - 45.0 mmHg    pO2, Arterial 48.0 (LL) 80.0 - 100.0 mmHg    HCO3, Arterial 27.3 (H) 22.0 - 26.0 mmol/L    Base Excess, Arterial 2.2 (H) -2.0 - 2.0 mmol/L    Hemoglobin, Art, Extended 13.7 12.0 - 16.0 g/dL    O2 Sat, Arterial 83.6 (LL) >92 %    Carboxyhgb, Arterial 1.8 0.0 - 5.0 %    Methemoglobin, Arterial 0.9 <1.5 %    O2 Content, Arterial 16.1 Not Established mL/dL    O2 Therapy Unknown    Potassium, Whole Blood    Collection Time: 10/04/21  5:14 AM   Result Value Ref Range    Potassium, Whole Blood 3.7    Lactic Acid, Plasma    Collection Time: 10/04/21 10:59 AM   Result Value Ref Range    Lactic Acid 2.4 (HH) 0.5 - 1.9 mmol/L   D-Dimer, Quantitative    Collection Time: 10/04/21 11:23 AM   Result Value Ref Range    D-Dimer, Quant 0.33 0.00 - 0.48 ug/mL FEU       I/O this shift:  In: 240 [P.O.:240]  Out: 280 [Urine:280]    XR CHEST PORTABLE    Result Date: 10/4/2021  Left lung infiltrate and a small bibasal pleural effusion.  Signed by Dr Salome Lima and Plan:   COVID-19 pneumonia  Discharged from this facility on 10/1/2021 s/p COVID-19 pneumonia treatment  Received steroids/baricitinib/adjuvant therapy/etc during that admission  Discharged to [facility] on 2 L  ECF staff reports noncompliance with supplemental oxygen  Currently requiring BiPAP  Dexamethasone  Remdesivir if meets criteria  Encouraged self proning  Empiric broad-spectrum antibiotics to cover potential secondary bacterial infection  Unknown vaccination status    Acute hypoxemic respiratory failure  Secondary to above  BiPAP 12/6 with 30 L  Consider high flow if work of breathing improves  Follow ABG  May require intubation  History of COPD/prior tobacco dependence    Acute cystitis  History of ESBL E. coli/Enterococcus UTI  Broad-spectrum antibiotics while awaiting urine culture    Multiple sclerosis  Agents on board    Bipolar disorder  Agents on board    Query dementia  Supportive care    DVT prophylaxis  Eliquis    Guardianship papers signed. Social work following.     Total critical care time: 70 minutes  Total time spent managing the care of this patient: 70 minutes    Oleksandr Tavarez MD  10/4/2021 8:07 PM

## 2021-10-04 NOTE — ED NOTES
Soiled brief removed; meplex noted to sacral area dated 10/3       Ignacio Oconnor, RN  10/04/21 0552

## 2021-10-04 NOTE — ACP (ADVANCE CARE PLANNING)
Advance Care Planning     Advance Care Planning Activator (Inpatient)  Conversation Note      Date of ACP Conversation: 10/4/2021     Conversation Conducted with: Christian Ng, Primary Decision Maker    ACP Activator: Cait Fernando      Current Designated Health Care Decision Maker:     Primary Decision Maker: Ector Sofya - Brother/Sister - [phone]      Care Preferences    Ventilation: \"If you were in your present state of health and suddenly became very ill and were unable to breathe on your own, what would your preference be about the use of a ventilator (breathing machine) if it were available to you? \"      Would the patient desire the use of ventilator (breathing machine)?: YES            \"In the event your heart stopped as a result of an underlying serious health condition, would you want attempts to be made to restart your heart (answer \"yes\" for attempt to resuscitate) or would you prefer a natural death (answer \"no\" for do not attempt to resuscitate)? \" YES      Sister, Christian Ng, is seeking guardianship so will keep ACP same as last visit until that is settled. Personal conversation with Donn Perez who appreciated the call.     Electronically signed by Cait Fernando on 10/4/2021 at 9:49 AM

## 2021-10-04 NOTE — CARE COORDINATION
Discussed pt concerns with P/C RN Vita Jacobs as pt is a permanent resident at [facility] and has no assigned HCS or POA/guardian. P/C RN reports pt's sister has requested guardianship previously but the facility physician would not complete the paperwork. SW stated could attempt at this time with the pt being admitted and to transfer to CCU. This SW discussed with Rishi Tenorio who covered the pt the previous admission last week and agreed to reach out to the sister and determine if still agreeable to petition for guardianship. [place] Revised Statutes re: responsible parties authorized to make healthcare decisions. *If an adult Pt, whose physician has determined that he or she does not have decisional capacity and has not executed an advance directive, the below shall be authorized to make healthcare decisions on behalf of the patient (if willing/available/competent):     1. The judicially appointed guardian  2. The -in-fact: DPOA/HCS  3. Spouse of the Pt  4. Adult child of the Pt-if more than one, majority rules  5. Parents of the Pt  6.  Nearest living relative-if more than one, majority rules

## 2021-10-04 NOTE — FLOWSHEET NOTE
10/04/21 0953   Encounter Summary   Services provided to: Patient   Referral/Consult From: [address] Family members  (staff and friends at [facility])   Complexity of Encounter High   Length of Encounter 30 minutes   Spiritual/Yazdanism   Type Spiritual support   Assessment Anxious; Fearful   Intervention Active listening;Explored feelings, thoughts, concerns;Prayer;Sustaining presence/ Ministry of presence; Discussed relationship with God;Discussed belief system/Sabianist practices/raoul   Outcome Connection/belonging;Expressed gratitude;Engaged in conversation; Tearful;Receptive  ( knows pt from personally; she was happy to see me.)

## 2021-10-04 NOTE — ED PROVIDER NOTES
[facility]  eMERGENCY dEPARTMENT eNCOUnter      Pt Name: Steffen Beyer  MRN: [id number]  Joaquinagfranjana 1960  Date of evaluation: 10/4/2021  Provider: MD Ada Rodriguez       Chief Complaint   Patient presents with    Positive For Covid-19     3L per [facility] at [facility]    Respiratory Distress     pt took O2 off at SNF, 76% on 6L per NRB per EMS upon arrival. Albuterol neb in route and 84% on 15L per NRB         HISTORY OF PRESENT ILLNESS   (Location/Symptom, Timing/Onset,Context/Setting, Quality, Duration, Modifying Factors, Severity)  Note limiting factors. Steffen Beyer is a 59 y.o. female who presents to the emergency department for evaluation regarding increased shortness of breath. EMS reported upon their arrival patient noted to be hypoxic with oxygen saturation at 76% on 6 L nonrebreather. She was given nebulizer treatment, with subsequent improvement in her oxygen saturation to 84% on 15 L. Patient was just discharged to the nursing home from the hospital on 10/1 after an inpatient hospitalization secondary to COVID-19 infection. Apparently she was supposed to be wearing 3 L nasal cannula but aids reports that she was off this when they found her to be hypoxic. She has a prior history of COPD, bipolar disorder, multiple sclerosis. HPI    NursingNotes were reviewed. REVIEW OF SYSTEMS    (2-9 systems for level 4, 10 or more for level 5)     Review of Systems   Unable to perform ROS: Dementia            PAST MEDICALHISTORY     Past Medical History:   Diagnosis Date    Anxiety     Arthritis     [facility]     Borderline personality disorder ([facility]     COPD (chronic obstructive pulmonary disease) (HCC)     GERD (gastroesophageal reflux disease)     Hyperlipidemia     Hypothyroidism     MS (multiple sclerosis) ([facility]     Palliative care patient 09/28/2021    Vitamin D deficiency          SURGICAL HISTORY     History reviewed.  No pertinent surgical history. CURRENT MEDICATIONS     Previous Medications    ALBUTEROL SULFATE  (90 BASE) MCG/ACT INHALER    Inhale 2 puffs into the lungs every 4 hours as needed for Wheezing or Shortness of Breath    AMMONIUM LACTATE (LAC-HYDRIN) 12 % LOTION    Apply topically as needed for Dry Skin Apply topically as needed. APIXABAN (ELIQUIS) 2.5 MG TABS TABLET    Take 1 tablet by mouth 2 times daily    ARGININE 500 MG CAPS    Take 500 mg by mouth daily    BIOTIN 1 MG CAPS    Take by mouth    BUDESONIDE-FORMOTEROL (SYMBICORT) 160-4.5 MCG/ACT AERO    Inhale 2 puffs into the lungs 2 times daily    BUSPIRONE (BUSPAR) 5 MG TABLET    Take 5 mg by mouth 2 times daily     CADEXOMER IODINE (IODOSORB) 0.9 % GEL    Apply topically daily Apply topically daily as needed. CALCIUM CARBONATE (TUMS) 500 MG CHEWABLE TABLET    Take 1 tablet by mouth 3 times daily as needed     CALCIUM-VITAMIN D (OSCAL-500) 500-200 MG-UNIT PER TABLET    Take 1 tablet by mouth 2 times daily Calcium carbonate-vitamin D3 600mg-400unit    CAMPHOR-MENTHOL (ARCTIC RELIEF PAIN RELIEVING) 0.2-3.5 % GEL    Apply topically 2 times daily as needed    CYCLOBENZAPRINE (FLEXERIL) 10 MG TABLET    Take 10 mg by mouth 3 times daily as needed for Muscle spasms    DEXAMETHASONE (DECADRON) 2 MG TABLET    Take 3 tablets by mouth daily (with breakfast) for 5 days, THEN 2 tablets daily (with breakfast) for 5 days, THEN 1 tablet daily (with breakfast) for 5 days.     DIPHENHYDRAMINE (BENADRYL) 25 MG CAPSULE    Take 25 mg by mouth every 6 hours as needed for Itching    DIVALPROEX (DEPAKOTE) 250 MG DR TABLET    Take 2 tablets by mouth 3 times daily    DOCUSATE SODIUM (COLACE) 100 MG CAPSULE    Take 100 mg by mouth daily    FAMOTIDINE (PEPCID) 20 MG TABLET    Take 20 mg by mouth daily     FENOFIBRATE (TRICOR) 145 MG TABLET    Take 145 mg by mouth daily    FLUOXETINE (PROZAC) 10 MG CAPSULE    Take 1 capsule by mouth daily    GABAPENTIN (NEURONTIN) 300 MG CAPSULE    Take 300 mg by mouth 3 times daily. GLATIRAMER (COPAXONE) 20 MG/ML INJECTION    Inject 20 mg into the skin daily     HYDROCODONE-ACETAMINOPHEN (NORCO) 7.5-325 MG PER TABLET    Take 1 tablet by mouth every 6 hours as needed for Pain for up to 3 days. IBUPROFEN (ADVIL;MOTRIN) 600 MG TABLET    Take 600 mg by mouth every 12 hours as needed for Pain    LEVOTHYROXINE (SYNTHROID) 75 MCG TABLET    Take 75 mcg by mouth Daily    METHYLPHENIDATE (RITALIN) 10 MG TABLET    Take 10 mg by mouth 2 times daily. TRAZODONE (DESYREL) 50 MG TABLET    Take 50 mg by mouth nightly    VITAMIN D (CHOLECALCIFEROL) 50 MCG (2000 UT) TABS TABLET    Take 1 tablet by mouth daily       ALLERGIES     Patient has no known allergies. FAMILY HISTORY     History reviewed. No pertinent family history. SOCIAL HISTORY       Social History     Socioeconomic History    Marital status: Single     Spouse name: None    Number of children: None    Years of education: None    Highest education level: None   Occupational History    None   Tobacco Use    Smoking status: Former Smoker    Smokeless tobacco: Never Used   Substance and Sexual Activity    Alcohol use: Not Currently    Drug use: Not Currently    Sexual activity: None   Other Topics Concern    None   Social History Narrative    None     Social Determinants of Health     Financial Resource Strain:     Difficulty of Paying Living Expenses:    Food Insecurity:     Worried About Running Out of Food in the Last Year:     Ran Out of Food in the Last Year:    Transportation Needs:     Lack of Transportation (Medical):      Lack of Transportation (Non-Medical):    Physical Activity:     Days of Exercise per Week:     Minutes of Exercise per Session:    Stress:     Feeling of Stress :    Social Connections:     Frequency of Communication with Friends and Family:     Frequency of Social Gatherings with Friends and Family:     Attends Sabianist Services:     Active Member of Clubs or areinterpreted by the Emergency Department Physician who either signs or Co-signs this chart in the absence of a cardiologist.    0503: Sinus tachycardia rate of 126, no evidence of acute ST elevation identified. QTc: 487 MS. RADIOLOGY:  Non-plain film images such as CT, Ultrasound and MRI are read by the radiologist. Plain radiographic images are visualized and preliminarily interpreted bythe emergency physician with the below findings:    CXR: No significant pulmonary vascular congestion or significant lobar infiltrates identified. XR CHEST PORTABLE    (Results Pending)           LABS:  Labs Reviewed   BLOOD GAS, ARTERIAL - Abnormal; Notable for the following components:       Result Value    pO2, Arterial 48.0 (*)     HCO3, Arterial 27.3 (*)     Base Excess, Arterial 2.2 (*)     O2 Sat, Arterial 83.6 (*)     All other components within normal limits    Narrative:     [facility] stephen pagan rn, 10/04/2021 05:15, by Carrie Grace   CBC WITH AUTO DIFFERENTIAL - Abnormal; Notable for the following components:    WBC 19.9 (*)     RBC 3.90 (*)     .4 (*)     MCH 33.6 (*)     MCHC 31.3 (*)     Neutrophils Absolute 12.6 (*)     Lymphocytes Absolute 4.7 (*)     Monocytes Absolute 1.90 (*)     All other components within normal limits   COMPREHENSIVE METABOLIC PANEL - Abnormal; Notable for the following components:    CREATININE 0.3 (*)     Albumin 3.0 (*)     All other components within normal limits   URINE RT REFLEX TO CULTURE - Abnormal; Notable for the following components:    Color, UA ORANGE (*)     Clarity, UA TURBID (*)     Bilirubin Urine SMALL (*)     Blood, Urine LARGE (*)     Nitrite, Urine POSITIVE (*)     Leukocyte Esterase, Urine LARGE (*)     All other components within normal limits   LACTIC ACID, PLASMA - Abnormal; Notable for the following components:    Lactic Acid 2.9 (*)     All other components within normal limits    Narrative:     [facility]  [facility] ,  Chemistry results called to and read back by [facility] [place] [facility]"" ED,  10/04/2021 05:48, by [facility]   MICROSCOPIC URINALYSIS - Abnormal; Notable for the following components:    WBC, UA TNTC (*)     RBC, UA TNTC (*)     Bacteria, UA 4+ (*)     All other components within normal limits   CULTURE, BLOOD 2   CULTURE, BLOOD 1   CULTURE, URINE   POTASSIUM, WHOLE BLOOD    Narrative:     CALL  [facility] tel. , stephen pagan rn, 10/04/2021 05:15, by [facility]   D-DIMER, QUANTITATIVE       All other labs were within normal range or not returned as of this dictation. EMERGENCY DEPARTMENT COURSE and DIFFERENTIAL DIAGNOSIS/MDM:   Vitals:    Vitals:    10/04/21 0458 10/04/21 0500 10/04/21 0509 10/04/21 0601   BP:    (!) 144/101   Pulse: 125 125 124 99   Resp: 28 18 28 28   Temp:       TempSrc:       SpO2: 90% (!) 88% 93% 99%   Weight:  151 lb (68.5 kg)     Height:  5' 5\" (1.651 m)         MDM    Reassessment    Patient ABG reveals evidence of hypoxemic respiratory failure with a PO2 of 48. She was placed on BiPAP therapy upon arrival.  Initially patient's oxygen saturation was only running in the high 80s however this is now improved, currently 91 to 93%. Her urinalysis reveals evidence of urinary tract infection. I have given her a dose of IV steroids along with IV antibiotics here in the ED. Blood cultures and urine cultures have been obtained. She will require inpatient admission to the intensive care unit. CONSULTS:    Case was discussed with Dr. Chris Fortune regarding inpatient admission to the hospitalist service. PROCEDURES:  Unless otherwise noted below, none     Procedures     CRITICAL CARE TIME   Total Critical Care time was 42 minutes, excluding separately reportable procedures. There was a high probability of clinically significant/life threatening deterioration in the patient's condition which required my urgent intervention.   Patient required my immediate presence at the bedside. Multiple reexaminations were undertaken throughout ED course of care. Time was spent reviewing plan of care with ED nursing staff. Time is inclusive of  and clinical documentation. FINAL IMPRESSION      1. Acute hypoxemic respiratory failure due to COVID-19 ([facility] [address]    2.  Acute cystitis without hematuria          DISPOSITION/PLAN   DISPOSITION Decision To Admit 10/04/2021 06:37:40 AM    (Please note that portions of this note were completed with a voice recognition program.  Efforts were made to edit thedictations but occasionally words are mis-transcribed.)    Nicky Garcias MD (electronically signed)  Attending Emergency Physician         Nicky Garcias MD  10/04/21 3686

## 2021-10-04 NOTE — PROGRESS NOTES
Component Value Ref Range & Units Status Collected Lab   pH, Arterial 7.410  7.350 - 7.450 Final 10/04/2021  5:14 AM [facility]   pCO2, Arterial 43.0  35.0 - 45.0 mmHg Final 10/04/2021  5:14 AM [facility]   pO2, Arterial 48. 0Low Panic   80.0 - 100.0 mmHg Final 10/04/2021  5:14 AM [facility]   HCO3, Arterial 27. 3High   22.0 - 26.0 mmol/L Final 10/04/2021  5:14 AM [facility] Excess, Arterial 2.2High   -2.0 - 2.0 mmol/L Final 10/04/2021  5:14 AM [facility]   Hemoglobin, Art, Extended 13.7  12.0 - 16.0 g/dL Final 10/04/2021  5:14 AM [facility]   O2 Sat, Arterial 83.6Low Panic   >92 % Final 10/04/2021  5:14 AM [facility]   Carboxyhgb, Arterial 1.8  0.0 - 5.0 % Final 10/04/2021  5:14 AM [facility]        0.0-1.5   (Smokers 1.5-5.0)    Methemoglobin, Arterial 0.9  <1.5 % Final 10/04/2021  5:14 AM [facility]   O2 Content, Arterial 16.1          bipap 12/6 with o2 @ 30lpm  r-25bpm  Right radial  At+

## 2021-10-04 NOTE — ED NOTES
Bed: 02-A  Expected date:   Expected time:   Means of arrival:   Comments:  VERENICE Cho  10/04/21 9933

## 2021-10-04 NOTE — PROGRESS NOTES
Pharmacy Note  Vancomycin Consult    Sandra Tadeo is a 59 y.o. female started on Vancomycin for CAP; consult received from Dr. Iram Kimble to manage therapy. Also receiving the following antibiotics: Zosyn. Active Problems:    Acute respiratory failure due to COVID-19 [facility])  Resolved Problems:    * No resolved hospital problems. *      Allergies:  Patient has no known allergies. Temp max: 97.4    Recent Labs     10/04/21  0455   BUN 14       Recent Labs     10/04/21  0455   CREATININE 0.3*       Recent Labs     10/04/21  0455   WBC 19.9*         Intake/Output Summary (Last 24 hours) at 10/4/2021 1015  Last data filed at 10/4/2021 0520  Gross per 24 hour   Intake --   Output 50 ml   Net -50 ml       Culture Date Source Results   10/04/21 Blood x 2 Ordered   10/04/21 Urine Ordered       Ht Readings from Last 1 Encounters:   10/04/21 5' 5\" (1.651 m)        Wt Readings from Last 1 Encounters:   10/04/21 151 lb (68.5 kg)         Body mass index is 25.13 kg/m². Estimated Creatinine Clearance: 187 mL/min (A) (based on SCr of 0.3 mg/dL (L)). Assessment/Plan:  Will initiate Vancomycin 1750 mg IV x 1 followed by Vancomycin 1000 mg IV every 8 hrs. Timing of trough level will be determined based on culture results, renal function, and clinical response. Thank you for the consult. Will continue to follow.     Electronically signed by Germain Jean, [facility] on 10/4/2021 at 10:15 AM

## 2021-10-04 NOTE — CARE COORDINATION
Spoke with pt's sister, Brenda Tolliver, who stated that she has been attempting to obtain emergency guardianship for pt. She reports that the only thing that she needs at this time is a signed letter from a physician. Once Hospitalist is assigned to pt, this SW will complete guardianship request to be signed by pt's MD and email to [email or web address]. Electronically signed by Luisito Nicholas on 10/4/2021 at 8:38 AM    Guardianship paperwork sent to pt's sister via email. She plans to take this to [facility] today.    Electronically signed by Luisito Nicholas on 10/4/2021 at 9:57 AM

## 2021-10-05 LAB
ALBUMIN SERPL-MCNC: 2.7 G/DL (ref 3.5–5.2)
ALP BLD-CCNC: 61 U/L (ref 35–104)
ALT SERPL-CCNC: 10 U/L (ref 5–33)
ANION GAP SERPL CALCULATED.3IONS-SCNC: 10 MMOL/L (ref 7–19)
AST SERPL-CCNC: 16 U/L (ref 5–32)
BASOPHILS ABSOLUTE: 0.1 K/UL (ref 0–0.2)
BASOPHILS RELATIVE PERCENT: 0.4 % (ref 0–1)
BILIRUB SERPL-MCNC: 0.8 MG/DL (ref 0.2–1.2)
BUN BLDV-MCNC: 16 MG/DL (ref 8–23)
CALCIUM SERPL-MCNC: 9.7 MG/DL (ref 8.8–10.2)
CHLORIDE BLD-SCNC: 102 MMOL/L (ref 98–111)
CO2: 27 MMOL/L (ref 22–29)
CREAT SERPL-MCNC: 0.3 MG/DL (ref 0.5–0.9)
EOSINOPHILS ABSOLUTE: 0 K/UL (ref 0–0.6)
EOSINOPHILS RELATIVE PERCENT: 0.1 % (ref 0–5)
GFR AFRICAN AMERICAN: >59
GFR NON-AFRICAN AMERICAN: >60
GLUCOSE BLD-MCNC: 76 MG/DL (ref 74–109)
HCT VFR BLD CALC: 40.5 % (ref 37–47)
HEMOGLOBIN: 12.4 G/DL (ref 12–16)
IMMATURE GRANULOCYTES #: 0.3 K/UL
LYMPHOCYTES ABSOLUTE: 4.8 K/UL (ref 1.1–4.5)
LYMPHOCYTES RELATIVE PERCENT: 30.5 % (ref 20–40)
MAGNESIUM: 1.4 MG/DL (ref 1.6–2.4)
MCH RBC QN AUTO: 33.5 PG (ref 27–31)
MCHC RBC AUTO-ENTMCNC: 30.6 G/DL (ref 33–37)
MCV RBC AUTO: 109.5 FL (ref 81–99)
MONOCYTES ABSOLUTE: 1.9 K/UL (ref 0–0.9)
MONOCYTES RELATIVE PERCENT: 12.3 % (ref 0–10)
NEUTROPHILS ABSOLUTE: 8.5 K/UL (ref 1.5–7.5)
NEUTROPHILS RELATIVE PERCENT: 54.7 % (ref 50–65)
PDW BLD-RTO: 13.9 % (ref 11.5–14.5)
PLATELET # BLD: 326 K/UL (ref 130–400)
PMV BLD AUTO: 10.5 FL (ref 9.4–12.3)
POTASSIUM SERPL-SCNC: 3.8 MMOL/L (ref 3.5–5)
RBC # BLD: 3.7 M/UL (ref 4.2–5.4)
SODIUM BLD-SCNC: 139 MMOL/L (ref 136–145)
TOTAL PROTEIN: 5.9 G/DL (ref 6.6–8.7)
VANCOMYCIN TROUGH: 38 UG/ML (ref 10–20)
WBC # BLD: 15.6 K/UL (ref 4.8–10.8)

## 2021-10-05 PROCEDURE — 6360000002 HC RX W HCPCS: Performed by: INTERNAL MEDICINE

## 2021-10-05 PROCEDURE — 94761 N-INVAS EAR/PLS OXIMETRY MLT: CPT

## 2021-10-05 PROCEDURE — 83735 ASSAY OF MAGNESIUM: CPT

## 2021-10-05 PROCEDURE — 80202 ASSAY OF VANCOMYCIN: CPT

## 2021-10-05 PROCEDURE — 6370000000 HC RX 637 (ALT 250 FOR IP): Performed by: INTERNAL MEDICINE

## 2021-10-05 PROCEDURE — 1210000000 HC MED SURG R&B

## 2021-10-05 PROCEDURE — 2700000000 HC OXYGEN THERAPY PER DAY

## 2021-10-05 PROCEDURE — 2580000003 HC RX 258: Performed by: INTERNAL MEDICINE

## 2021-10-05 PROCEDURE — 80053 COMPREHEN METABOLIC PANEL: CPT

## 2021-10-05 PROCEDURE — 85025 COMPLETE CBC W/AUTO DIFF WBC: CPT

## 2021-10-05 RX ORDER — MAGNESIUM SULFATE IN WATER 40 MG/ML
2000 INJECTION, SOLUTION INTRAVENOUS PRN
Status: DISCONTINUED | OUTPATIENT
Start: 2021-10-05 | End: 2021-10-10 | Stop reason: HOSPADM

## 2021-10-05 RX ADMIN — APIXABAN 2.5 MG: 2.5 TABLET, FILM COATED ORAL at 07:42

## 2021-10-05 RX ADMIN — FENOFIBRATE 160 MG: 160 TABLET ORAL at 07:42

## 2021-10-05 RX ADMIN — Medication 1 TABLET: at 07:42

## 2021-10-05 RX ADMIN — MAGNESIUM SULFATE HEPTAHYDRATE 2000 MG: 40 INJECTION, SOLUTION INTRAVENOUS at 15:59

## 2021-10-05 RX ADMIN — DIVALPROEX SODIUM 500 MG: 500 TABLET, DELAYED RELEASE ORAL at 16:15

## 2021-10-05 RX ADMIN — GABAPENTIN 300 MG: 300 CAPSULE ORAL at 15:59

## 2021-10-05 RX ADMIN — Medication 2000 UNITS: at 07:42

## 2021-10-05 RX ADMIN — METHYLPHENIDATE HYDROCHLORIDE 10 MG: 5 TABLET ORAL at 07:42

## 2021-10-05 RX ADMIN — DIVALPROEX SODIUM 500 MG: 500 TABLET, DELAYED RELEASE ORAL at 07:42

## 2021-10-05 RX ADMIN — METHYLPHENIDATE HYDROCHLORIDE 10 MG: 5 TABLET ORAL at 19:18

## 2021-10-05 RX ADMIN — LEVOTHYROXINE SODIUM 75 MCG: 25 TABLET ORAL at 06:19

## 2021-10-05 RX ADMIN — FAMOTIDINE 20 MG: 20 TABLET ORAL at 07:42

## 2021-10-05 RX ADMIN — FLUOXETINE 10 MG: 10 CAPSULE ORAL at 07:42

## 2021-10-05 RX ADMIN — MEROPENEM 1000 MG: 1 INJECTION, POWDER, FOR SOLUTION INTRAVENOUS at 21:05

## 2021-10-05 RX ADMIN — GABAPENTIN 300 MG: 300 CAPSULE ORAL at 07:42

## 2021-10-05 RX ADMIN — MEROPENEM 1000 MG: 1 INJECTION, POWDER, FOR SOLUTION INTRAVENOUS at 06:17

## 2021-10-05 RX ADMIN — BUSPIRONE HYDROCHLORIDE 5 MG: 5 TABLET ORAL at 07:42

## 2021-10-05 RX ADMIN — DEXAMETHASONE SODIUM PHOSPHATE 6 MG: 10 INJECTION INTRAMUSCULAR; INTRAVENOUS at 11:35

## 2021-10-05 RX ADMIN — MEROPENEM 1000 MG: 1 INJECTION, POWDER, FOR SOLUTION INTRAVENOUS at 15:59

## 2021-10-05 RX ADMIN — DOCUSATE SODIUM 100 MG: 100 CAPSULE ORAL at 07:42

## 2021-10-05 RX ADMIN — BUDESONIDE AND FORMOTEROL FUMARATE DIHYDRATE 2 PUFF: 160; 4.5 AEROSOL RESPIRATORY (INHALATION) at 07:43

## 2021-10-05 RX ADMIN — VANCOMYCIN HYDROCHLORIDE 1000 MG: 10 INJECTION, POWDER, LYOPHILIZED, FOR SOLUTION INTRAVENOUS at 06:20

## 2021-10-05 ASSESSMENT — PAIN SCALES - GENERAL: PAINLEVEL_OUTOF10: 0

## 2021-10-05 NOTE — PROGRESS NOTES
Pharmacy Consult      Willy Chopra is a 59 y.o. female for whom pharmacy has been consulted to dose Remdesivir if meets criteria. Patient Active Problem List   Diagnosis    Pneumonia due to COVID-19 virus    Acute respiratory failure with hypoxia ([facility] [address]    COVID-19    Hx of multiple sclerosis ([facility] [address]    Bipolar disorder (HCC)    COPD (chronic obstructive pulmonary disease) ([facility]    Hyperlipidemia    Anxiety    Palliative care patient    Acute respiratory failure due to COVID-19 [facility])       Allergies:  Patient has no known allergies. Recent Labs     10/04/21  0455   CREATININE 0.3*       Ht/Wt:   Ht Readings from Last 1 Encounters:   10/04/21 5' 5\" (1.651 m)        Wt Readings from Last 1 Encounters:   10/04/21 151 lb (68.5 kg)         Estimated Creatinine Clearance: 187 mL/min (A) (based on SCr of 0.3 mg/dL (L)). Assessment/Plan:    Patient does not meet criteria for Remdesivir. She tested positive on 09/27/21, was admitted, and start on  Remdesivir at that time    Thank you for the consult. Will continue to follow.     Electronically signed by Lilia Gloria, [address] [place] [address] on 10/4/2021 at 9:00 PM

## 2021-10-05 NOTE — CARE COORDINATION
Dtr called this SW to inform that they have set a guardianship hearing for Thursday, 10/7.    Electronically signed by Terry Goodman on 10/5/2021 at 12:52 PM

## 2021-10-05 NOTE — PROGRESS NOTES
Hospitalist Progress Note  [facility]     Patient: Ramsey Alexander  : 1960  MRN: [id number]  Code Status: Full Code    Hospital Day: 1   Date of Service: 10/5/2021    Subjective:   Patient seen in [place] [address] critical care unit. No acute distress. Past Medical History:   Diagnosis Date    Anxiety     Arthritis     Bipolar 1 disorder ([facility]     Borderline personality disorder ([facility]     COPD (chronic obstructive pulmonary disease) ([facility]     GERD (gastroesophageal reflux disease)     Hyperlipidemia     Hypothyroidism     MS (multiple sclerosis) ([facility]     Palliative care patient 2021    Vitamin D deficiency        History reviewed. No pertinent surgical history. History reviewed. No pertinent family history. Social History     Socioeconomic History    Marital status: Single     Spouse name: Not on file    Number of children: Not on file    Years of education: Not on file    Highest education level: Not on file   Occupational History    Not on file   Tobacco Use    Smoking status: Former Smoker    Smokeless tobacco: Never Used   Substance and Sexual Activity    Alcohol use: Not Currently    Drug use: Not Currently    Sexual activity: Not on file   Other Topics Concern    Not on file   Social History Narrative    Not on file     Social Determinants of Health     Financial Resource Strain:     Difficulty of Paying Living Expenses:    Food Insecurity:     Worried About Running Out of Food in the Last Year:     [address] in the Last Year:    Transportation Needs:     Lack of Transportation (Medical):      Lack of Transportation (Non-Medical):    Physical Activity:     Days of Exercise per Week:     Minutes of Exercise per Session:    Stress:     Feeling of Stress :    Social Connections:     Frequency of Communication with Friends and Family:     Frequency of Social Gatherings with Friends and Family:     Attends [employer] Services:     Active Member of Clubs or Organizations:     Attends Club or Organization Meetings:     Marital Status:    Intimate Partner Violence:     Fear of Current or Ex-Partner:     Emotionally Abused:     Physically Abused:     Sexually Abused:        Current Facility-Administered Medications   Medication Dose Route Frequency Provider Last Rate Last Admin    magnesium sulfate 2000 mg in 50 mL IVPB premix  2,000 mg IntraVENous PRN Richard Holm MD        dexamethasone (PF) (DECADRON) injection 6 mg  6 mg IntraVENous Q24H Richard Holm MD   6 mg at 10/05/21 1135    ipratropium-albuterol (DUONEB) nebulizer solution 1 ampule  1 ampule Inhalation Q4H Richard Holm MD        albuterol sulfate  (90 Base) MCG/ACT inhaler 2 puff  2 puff Inhalation Q4H PRN Richard Holm MD   2 puff at 10/04/21 2114    apixaban (ELIQUIS) tablet 2.5 mg  2.5 mg Oral BID Richard Holm MD   2.5 mg at 10/05/21 5723    budesonide-formoterol (SYMBICORT) 160-4.5 MCG/ACT inhaler 2 puff  2 puff Inhalation BID Richard Holm MD   2 puff at 10/05/21 0743    busPIRone (BUSPAR) tablet 5 mg  5 mg Oral BID Richard Holm MD   5 mg at 10/05/21 8785    calcium carbonate (TUMS) chewable tablet 500 mg  1 tablet Oral TID PRN Richard Holm MD        vitamin D (CHOLECALCIFEROL) tablet 2,000 Units  2,000 Units Oral Daily Richard Holm MD   2,000 Units at 10/05/21 0742    traZODone (DESYREL) tablet 50 mg  50 mg Oral Nightly Richard Holm MD   50 mg at 10/04/21 2035    methylphenidate (RITALIN) tablet 10 mg  10 mg Oral BID Richard Holm MD   10 mg at 10/05/21 9048    levothyroxine (SYNTHROID) tablet 75 mcg  75 mcg Oral Daily Richard Holm MD   75 mcg at 10/05/21 6755    glatiramer (COPAXONE) injection 20 mg  20 mg SubCUTAneous Daily Richard Holm MD        gabapentin (NEURONTIN) capsule 300 mg  300 mg Oral TID Richard Holm MD   300 mg at 10/05/21 0742    FLUoxetine (PROZAC) capsule 10 mg  10 mg Oral Daily Richard Holm MD   10 mg at 10/05/21 8163  fenofibrate (TRIGLIDE) tablet 160 mg  160 mg Oral Daily Angelina Mcintyre MD   160 mg at 10/05/21 7200    famotidine (PEPCID) tablet 20 mg  20 mg Oral Daily Angelina Mcintyre MD   20 mg at 10/05/21 0316    docusate sodium (COLACE) capsule 100 mg  100 mg Oral Daily Angelina Mcintyre MD   100 mg at 10/05/21 3757    divalproex (DEPAKOTE) DR tablet 500 mg  500 mg Oral TID Angelina Mcintyre MD   500 mg at 10/05/21 3972    diphenhydrAMINE (BENADRYL) tablet 25 mg  25 mg Oral Q6H PRN Angelina Mcintyre MD        cyclobenzaprine (FLEXERIL) tablet 10 mg  10 mg Oral TID PRN Angelina Mcintyre MD        calcium-cholecalciferol 500-200 MG-UNIT per tablet 1 tablet  1 tablet Oral BID Angelina Mcintyre MD   1 tablet at 10/05/21 0316    acetaminophen (TYLENOL) tablet 650 mg  650 mg Oral Q6H PRN Angelina Mcintyre MD        Or    acetaminophen (TYLENOL) suppository 650 mg  650 mg Rectal Q6H PRN Angelina Mcintyre MD        vancomycin (VANCOCIN) 1000 mg in dextrose 5% 250 mL IVPB  1,000 mg IntraVENous Q8H Angelina Mcintyre MD   Stopped at 10/05/21 5494    vancomycin (VANCOCIN) intermittent dosing (placeholder)   Other RX Placeholder Angelina Mcintyre MD        meropenem [facility]) 1,000 mg in sterile water 20 mL IV syringe  1,000 mg IntraVENous Q8H Angelina Mcintyre MD   1,000 mg at 10/05/21 0617             Objective:   /68   Pulse 103   Temp 97.7 °F (36.5 °C) (Temporal)   Resp 27   Ht 5' 5\" (1.651 m)   Wt 151 lb (68.5 kg)   SpO2 90%   BMI 25.13 kg/m²     In an effort to reduce COVID-19 exposure, patient seen from doorway and case discussed in detail with unit staff    Recent Labs     10/04/21  0455 10/05/21  2448   WBC 19.9* 15.6*   RBC 3.90* 3.70*   HGB 13.1 12.4   HCT 41.9 40.5   .4* 109.5*   MCH 33.6* 33.5*   MCHC 31.3* 30.6*    326     Recent Labs     10/04/21  0455 10/04/21  0514 10/05/21  0448     --  139   K 3.8 3.7 3.8   ANIONGAP 11  --  10     --  102   CO2 25  --  27   BUN 14  --  16   CREATININE 0.3*  --  0.3* GLUCOSE 101  --  76   CALCIUM 9.1  --  9.7     Recent Labs     10/05/21  0448   MG 1.4*     Recent Labs     10/04/21  0455 10/05/21  0448   AST 22 16   ALT 12 10   BILITOT 0.8 0.8   ALKPHOS 69 61     No results for input(s): PH, PO2, PCO2, HCO3, BE, O2SAT in the last 72 hours. No results for input(s): TROPONINI in the last 72 hours. No results for input(s): INR in the last 72 hours. Recent Labs     10/04/21  1059   LACTA 2.4*         Intake/Output Summary (Last 24 hours) at 10/5/2021 1527  Last data filed at 10/5/2021 1327  Gross per 24 hour   Intake 905 ml   Output 1540 ml   Net -635 ml       XR CHEST PORTABLE    Result Date: 10/4/2021  Examination. XR CHEST PORTABLE 10/4/2021 5:20 AM History: Shortness of breath. A frontal portable upright view of the chest is compared with the previous study dated 9/27/2021. The lungs are poorly expanded. There is a persistent left lower lung infiltrate. The dilatation of the left cardiac border may suggest infiltrate in the lingular segment of the upper lobe. There is a trace bibasal pleural effusion. The heart size is not evaluated due to obliteration of the cardiac border by the adjacent infiltrate. Atheromatous changes thoracic aorta are noted. No acute bony abnormality. Left lung infiltrate and a small bibasal pleural effusion.  Signed by Dr Mirella Lerner and Plan:   COVID-19 pneumonia  Discharged from this facility on 10/1/2021 s/p COVID-19 pneumonia treatment  Received steroids/baricitinib/adjuvant therapy/etc during that admission  Discharged to [facility] on 2 L  [facility] staff reports noncompliance with supplemental oxygen  Dexamethasone  Remdesivir if meets criteria  Encouraged self proning  Empiric broad-spectrum antibiotics to cover potential secondary bacterial infection  Unknown vaccination status     Acute hypoxemic respiratory failure  Secondary to above  Since weaned off BiPAP  Currently on 5 L  History of COPD/prior tobacco dependence     E. coli UTI  History of ESBL E. coli/Enterococcus UTI  Broad-spectrum antibiotics while awaiting current sensitivities    Sepsis  Suspect secondary to above processes  Agents as above  Follow cultures  IVF as tolerated  Lactate improved    Multiple sclerosis  Agents on board     Bipolar disorder  Agents on board     Query dementia  Supportive care     DVT prophylaxis  Eliquis     Guardianship papers signed. Social work following. Guardianship hearing 10/7/2021.     Total critical care time: 40 minutes    Linda Esqueda MD   10/5/2021 3:27 PM

## 2021-10-05 NOTE — PROGRESS NOTES
Vancomycin trough 38.0. Notified pharmacy and held 1200 dose.      Electronically signed by Twan Gillette RN on 10/5/2021 at 3:02 PM

## 2021-10-05 NOTE — PROGRESS NOTES
Pharmacy Vancomycin Consult     Vancomycin Day: 2  Current Dosing: Vancomycin 1 gm IV every 8 hours    Temp max:  97.7    Recent Labs     10/04/21  0455 10/05/21  0448   BUN 14 16       Recent Labs     10/04/21  0455 10/05/21  0448   CREATININE 0.3* 0.3*       Recent Labs     10/04/21  0455 10/05/21  0448   WBC 19.9* 15.6*         Intake/Output Summary (Last 24 hours) at 10/5/2021 1533  Last data filed at 10/5/2021 1327  Gross per 24 hour   Intake 905 ml   Output 1540 ml   Net -635 ml     Culture Date Source Results   10/04/21 Blood x 2 No growth to date   10/04/21 Urine Escherichia coli       Ht Readings from Last 1 Encounters:   10/04/21 5' 5\" (1.651 m)        Wt Readings from Last 1 Encounters:   10/04/21 151 lb (68.5 kg)         Body mass index is 25.13 kg/m². Estimated Creatinine Clearance: 187 mL/min (A) (based on SCr of 0.3 mg/dL (L)). Trough: 38.0    Assessment/Plan: Hold Vancomycin. Draw vancomycin random level with AM labs. Thank you for the consult. Will continue to follow.       Electronically signed by Freddy Campuzano, [facility] on 10/5/2021 at 3:33 PM

## 2021-10-05 NOTE — CONSULTS
**Physician Signature**  This document was electronically signed by: Bradford Cabezas MD  10/04/2021   10:44 AM    **Consult Information**  Member Facility: [address] [place] [address] Drive MRN: [id number]  Visit/Encounter Number: [id number]  Consult ID: [id number]  Facility Time Zone: CT  Date and Time of Request: 10/04/2021 10:06 AM  CT  Requesting Clinician: Rosa Macdonald MD  Patient Name: Sahara Centeno  Date of Birth:   Gender: Female  Patient identity was confirmed at the beginning of the consult with the   patient/family/staff using two personal identifiers: Patient name and       **Reason for Consult**  Reason for Consult: [facility]    **Admission**  Admission Date: 10-  Chief reason for ICU admission: COVID - 19  Secondary reasons for ICU admission: Respiratory Failure    **Core Metrics**  General orienting sentence for patient: 60 y/o F who was first positive for   COVID on . Admitted 10/3 for hypoxemia. She is on BiPAP,   30L.   Chief physiologic deterioration: Increase in FiO2 required by   30%  Is the patient on DVT prophylaxis?: Yes  Prophylaxis type: Pharmacological  Is the patient on GI prophylaxis?: Not Indicated  Has this patient reached their nutritional goal?: Yes  Are there current issues with pain management in this patient?:   No  Are there issues with skin integrity?: Yes and issues are being   addressed  Skin Integrity Details: Redness and abrasion on sacrum  Are there issues with delirium?: No  Has the patient been mobilized?: No  Is this patient currently intubated?: No  Are there ethical or care philosophy or family issues?: No  Family Issues Details: Admitted from NH  Do you recommend an in depth evaluation?: No  Disposition Recommendations: Continue ICU level of care    **Inserted/Removed Devices**  Device Name: Correa Catheter  Site of insertion: Urethra  Date of insertion: 10-    **Physician Signature**  This document was electronically signed by: Bradford Cabezas MD  10/04/2021   10:44 AM

## 2021-10-05 NOTE — PROGRESS NOTES
Physician Progress Note      Carlos Sinclair  CSN #:                  [id number]  :                       1960  ADMIT DATE:       10/4/2021 4:43 AM  DISCH DATE:  RESPONDING  PROVIDER #:        Telma RICKETTS          QUERY TEXT:    Pt admitted with COVID-19 and a UTI and noted to have SIRS. If possible,   please document in progress notes and discharge summary if you are evaluating   and/or treating: The medical record reflects the following:  Risk Factors: COVID-19 pneumonia, UTI  Clinical Indicators: WBC 19.9, LA 2.9, P 126, RR 28, sat 82%,   urinalysis-turbid/positive nitrites/large leuk esterase/WBC TNTC/4+ bacteria  Treatment: urine culture, blood cultures, Rocephin IV, Zosyn IV, duonebs,   Decadron IV, Vancomycin IV, Merrem IV    Thank you,  Norm CALDERA, CCDS  [phone]  Options provided:  -- Sepsis present on admission due to COVID-19 pneumonia  -- Sepsis present on admission due to UTI  -- Sepsis present on admission due to UTI and COVID-19 pneumonia  -- UTI and COVID-19 pneumonia without sepsis  -- Other - I will add my own diagnosis  -- Disagree - Not applicable / Not valid  -- Disagree - Clinically unable to determine / Unknown  -- Refer to Clinical Documentation Reviewer    PROVIDER RESPONSE TEXT:    This patient has sepsis which was present on admission due to UTI and COVID-19   pneumonia.     Query created by: Jay Parker on 10/5/2021 9:26 AM      Electronically signed by:  Wilfredo Sánchez 10/5/2021 9:35 AM

## 2021-10-06 LAB
ALBUMIN SERPL-MCNC: 2.7 G/DL (ref 3.5–5.2)
ALP BLD-CCNC: 59 U/L (ref 35–104)
ALT SERPL-CCNC: 11 U/L (ref 5–33)
ANION GAP SERPL CALCULATED.3IONS-SCNC: 7 MMOL/L (ref 7–19)
AST SERPL-CCNC: 20 U/L (ref 5–32)
BASOPHILS ABSOLUTE: 0.1 K/UL (ref 0–0.2)
BASOPHILS RELATIVE PERCENT: 0.5 % (ref 0–1)
BILIRUB SERPL-MCNC: 0.9 MG/DL (ref 0.2–1.2)
BUN BLDV-MCNC: 15 MG/DL (ref 8–23)
CALCIUM SERPL-MCNC: 9.1 MG/DL (ref 8.8–10.2)
CHLORIDE BLD-SCNC: 101 MMOL/L (ref 98–111)
CO2: 33 MMOL/L (ref 22–29)
CREAT SERPL-MCNC: 0.4 MG/DL (ref 0.5–0.9)
EOSINOPHILS ABSOLUTE: 0 K/UL (ref 0–0.6)
EOSINOPHILS RELATIVE PERCENT: 0.1 % (ref 0–5)
GFR AFRICAN AMERICAN: >59
GFR NON-AFRICAN AMERICAN: >60
GLUCOSE BLD-MCNC: 66 MG/DL (ref 74–109)
HCT VFR BLD CALC: 37.9 % (ref 37–47)
HEMOGLOBIN: 11.6 G/DL (ref 12–16)
IMMATURE GRANULOCYTES #: 0.3 K/UL
LYMPHOCYTES ABSOLUTE: 3 K/UL (ref 1.1–4.5)
LYMPHOCYTES RELATIVE PERCENT: 22.3 % (ref 20–40)
MAGNESIUM: 2.2 MG/DL (ref 1.6–2.4)
MCH RBC QN AUTO: 33.4 PG (ref 27–31)
MCHC RBC AUTO-ENTMCNC: 30.6 G/DL (ref 33–37)
MCV RBC AUTO: 109.2 FL (ref 81–99)
MONOCYTES ABSOLUTE: 1.7 K/UL (ref 0–0.9)
MONOCYTES RELATIVE PERCENT: 12.8 % (ref 0–10)
NEUTROPHILS ABSOLUTE: 8.3 K/UL (ref 1.5–7.5)
NEUTROPHILS RELATIVE PERCENT: 62.4 % (ref 50–65)
ORGANISM: ABNORMAL
PDW BLD-RTO: 13.9 % (ref 11.5–14.5)
PLATELET # BLD: 339 K/UL (ref 130–400)
PMV BLD AUTO: 10.7 FL (ref 9.4–12.3)
POTASSIUM SERPL-SCNC: 3.3 MMOL/L (ref 3.5–5)
RBC # BLD: 3.47 M/UL (ref 4.2–5.4)
SODIUM BLD-SCNC: 141 MMOL/L (ref 136–145)
TOTAL PROTEIN: 6 G/DL (ref 6.6–8.7)
URINE CULTURE, ROUTINE: ABNORMAL
URINE CULTURE, ROUTINE: ABNORMAL
VANCOMYCIN RANDOM: 14.2 UG/ML
WBC # BLD: 13.3 K/UL (ref 4.8–10.8)

## 2021-10-06 PROCEDURE — 6370000000 HC RX 637 (ALT 250 FOR IP): Performed by: INTERNAL MEDICINE

## 2021-10-06 PROCEDURE — 94761 N-INVAS EAR/PLS OXIMETRY MLT: CPT

## 2021-10-06 PROCEDURE — 80053 COMPREHEN METABOLIC PANEL: CPT

## 2021-10-06 PROCEDURE — 2580000003 HC RX 258: Performed by: INTERNAL MEDICINE

## 2021-10-06 PROCEDURE — 2700000000 HC OXYGEN THERAPY PER DAY

## 2021-10-06 PROCEDURE — 36415 COLL VENOUS BLD VENIPUNCTURE: CPT

## 2021-10-06 PROCEDURE — 80202 ASSAY OF VANCOMYCIN: CPT

## 2021-10-06 PROCEDURE — 85025 COMPLETE CBC W/AUTO DIFF WBC: CPT

## 2021-10-06 PROCEDURE — 6360000002 HC RX W HCPCS: Performed by: INTERNAL MEDICINE

## 2021-10-06 PROCEDURE — 83735 ASSAY OF MAGNESIUM: CPT

## 2021-10-06 PROCEDURE — 1210000000 HC MED SURG R&B

## 2021-10-06 RX ADMIN — BUSPIRONE HYDROCHLORIDE 5 MG: 5 TABLET ORAL at 20:09

## 2021-10-06 RX ADMIN — Medication 2000 UNITS: at 08:29

## 2021-10-06 RX ADMIN — FAMOTIDINE 20 MG: 20 TABLET ORAL at 08:30

## 2021-10-06 RX ADMIN — DIVALPROEX SODIUM 500 MG: 500 TABLET, DELAYED RELEASE ORAL at 14:14

## 2021-10-06 RX ADMIN — FENOFIBRATE 160 MG: 160 TABLET ORAL at 08:29

## 2021-10-06 RX ADMIN — METHYLPHENIDATE HYDROCHLORIDE 10 MG: 5 TABLET ORAL at 08:29

## 2021-10-06 RX ADMIN — VANCOMYCIN HYDROCHLORIDE 1000 MG: 10 INJECTION, POWDER, LYOPHILIZED, FOR SOLUTION INTRAVENOUS at 10:47

## 2021-10-06 RX ADMIN — BUSPIRONE HYDROCHLORIDE 5 MG: 5 TABLET ORAL at 08:30

## 2021-10-06 RX ADMIN — DEXAMETHASONE SODIUM PHOSPHATE 6 MG: 10 INJECTION INTRAMUSCULAR; INTRAVENOUS at 08:29

## 2021-10-06 RX ADMIN — LEVOTHYROXINE SODIUM 75 MCG: 25 TABLET ORAL at 06:20

## 2021-10-06 RX ADMIN — MEROPENEM 1000 MG: 1 INJECTION, POWDER, FOR SOLUTION INTRAVENOUS at 14:15

## 2021-10-06 RX ADMIN — METHYLPHENIDATE HYDROCHLORIDE 10 MG: 5 TABLET ORAL at 18:15

## 2021-10-06 RX ADMIN — MEROPENEM 1000 MG: 1 INJECTION, POWDER, FOR SOLUTION INTRAVENOUS at 20:09

## 2021-10-06 RX ADMIN — ACETAMINOPHEN 650 MG: 325 TABLET ORAL at 08:29

## 2021-10-06 RX ADMIN — DOCUSATE SODIUM 100 MG: 100 CAPSULE ORAL at 08:30

## 2021-10-06 RX ADMIN — MEROPENEM 1000 MG: 1 INJECTION, POWDER, FOR SOLUTION INTRAVENOUS at 04:45

## 2021-10-06 RX ADMIN — DIVALPROEX SODIUM 500 MG: 500 TABLET, DELAYED RELEASE ORAL at 08:30

## 2021-10-06 RX ADMIN — GABAPENTIN 300 MG: 300 CAPSULE ORAL at 08:29

## 2021-10-06 RX ADMIN — DIVALPROEX SODIUM 500 MG: 500 TABLET, DELAYED RELEASE ORAL at 20:09

## 2021-10-06 RX ADMIN — GABAPENTIN 300 MG: 300 CAPSULE ORAL at 20:09

## 2021-10-06 RX ADMIN — FLUOXETINE 10 MG: 10 CAPSULE ORAL at 08:30

## 2021-10-06 RX ADMIN — APIXABAN 2.5 MG: 2.5 TABLET, FILM COATED ORAL at 08:30

## 2021-10-06 RX ADMIN — Medication 1 TABLET: at 08:29

## 2021-10-06 RX ADMIN — Medication 1 TABLET: at 20:09

## 2021-10-06 RX ADMIN — GABAPENTIN 300 MG: 300 CAPSULE ORAL at 14:15

## 2021-10-06 RX ADMIN — APIXABAN 2.5 MG: 2.5 TABLET, FILM COATED ORAL at 20:09

## 2021-10-06 RX ADMIN — TRAZODONE HYDROCHLORIDE 50 MG: 50 TABLET ORAL at 20:09

## 2021-10-06 RX ADMIN — GLATIRAMER ACETATE 20 MG: 20 INJECTION, SOLUTION SUBCUTANEOUS at 08:29

## 2021-10-06 ASSESSMENT — PAIN SCALES - GENERAL: PAINLEVEL_OUTOF10: 3

## 2021-10-06 NOTE — PLAN OF CARE
Problem: Airway Clearance - Ineffective  Goal: Achieve or maintain patent airway  10/6/2021 0930 by Rema Kelsey RN  Outcome: Ongoing  10/5/2021 2237 by Jon Navarrete RN  Outcome: Ongoing  10/5/2021 1946 by Theresia Homans, RN  Outcome: Ongoing     Problem: Gas Exchange - Impaired  Goal: Absence of hypoxia  10/6/2021 0930 by Rema Kelsey RN  Outcome: Ongoing  10/5/2021 2237 by Jon Navarrete RN  Outcome: Ongoing  10/5/2021 1946 by Theresia Homans, RN  Outcome: Ongoing  Goal: Promote optimal lung function  10/6/2021 0930 by Rema Kelsey RN  Outcome: Ongoing  10/5/2021 2237 by Jon Navarrete RN  Outcome: Ongoing  10/5/2021 1946 by Theresia Homans, RN  Outcome: Ongoing     Problem: Breathing Pattern - Ineffective  Goal: Ability to achieve and maintain a regular respiratory rate  10/6/2021 0930 by Rema Kelsey RN  Outcome: Ongoing  10/5/2021 2237 by Jon Navarrete RN  Outcome: Ongoing  10/5/2021 1946 by Theresia Homans, RN  Outcome: Ongoing     Problem:  Body Temperature -  Risk of, Imbalanced  Goal: Ability to maintain a body temperature within defined limits  10/6/2021 0930 by Rema Kelsey RN  Outcome: Ongoing  10/5/2021 2237 by Jon Navarrete RN  Outcome: Ongoing  10/5/2021 1946 by Theresia Homans, RN  Outcome: Ongoing  Goal: Will regain or maintain usual level of consciousness  10/6/2021 0930 by Rema Kelsey RN  Outcome: Ongoing  10/5/2021 2237 by Jon Navarrete RN  Outcome: Ongoing  10/5/2021 1946 by Theresia Homans, RN  Outcome: Ongoing  Goal: Complications related to the disease process, condition or treatment will be avoided or minimized  10/6/2021 0930 by Rema Kelsye RN  Outcome: Ongoing  10/5/2021 2237 by Jon Navarrete RN  Outcome: Ongoing  10/5/2021 1946 by Theresia Homans, RN  Outcome: Ongoing     Problem: Isolation Precautions - Risk of Spread of Infection  Goal: Prevent transmission of infection  10/6/2021 0930 by Rema Kelsey RN  Outcome: Ongoing  10/5/2021 2237 by Jennifer Carty Bee Robert RN  Outcome: Ongoing  10/5/2021 1946 by Kayleen Larry RN  Outcome: Ongoing     Problem: Nutrition Deficits  Goal: Optimize nutritional status  10/6/2021 0930 by Areli Pena RN  Outcome: Ongoing  10/5/2021 2237 by [place] RN  Outcome: Ongoing  10/5/2021 1946 by Kayleen Larry RN  Outcome: Ongoing     Problem: Risk for Fluid Volume Deficit  Goal: Maintain normal heart rhythm  10/6/2021 0930 by Areli Pena RN  Outcome: Ongoing  10/5/2021 2237 by [place] RN  Outcome: Ongoing  10/5/2021 1946 by Kayleen Larry RN  Outcome: Ongoing  Goal: Maintain absence of muscle cramping  10/6/2021 0930 by Areli Pena RN  Outcome: Ongoing  10/5/2021 2237 by [place] RN  Outcome: Ongoing  10/5/2021 1946 by Kayleen Larry RN  Outcome: Ongoing  Goal: Maintain normal serum potassium, sodium, calcium, phosphorus, and pH  10/6/2021 0930 by Areli Pena RN  Outcome: Ongoing  10/5/2021 2237 by [place] RN  Outcome: Ongoing  10/5/2021 1946 by Kayleen Larry RN  Outcome: Ongoing     Problem: Loneliness or Risk for Loneliness  Goal: Demonstrate positive use of time alone when socialization is not possible  10/6/2021 0930 by Areli Pena RN  Outcome: Ongoing  10/5/2021 2237 by [place] RN  Outcome: Ongoing  10/5/2021 1946 by Kayleen Larry RN  Outcome: Ongoing     Problem: Fatigue  Goal: Verbalize increase energy and improved vitality  10/6/2021 0930 by Areli Pena RN  Outcome: Ongoing  10/5/2021 2237 by [place] RN  Outcome: Ongoing  10/5/2021 1946 by Kayleen Larry RN  Outcome: Ongoing     Problem: Patient Education: Go to Patient Education Activity  Goal: Patient/Family Education  10/6/2021 0930 by Aerli Pena RN  Outcome: Ongoing  10/5/2021 2237 by [place] RN  Outcome: Ongoing  10/5/2021 1946 by Kayleen Larry RN  Outcome: Ongoing     Problem: Skin Integrity:  Goal: Will show no infection signs and symptoms  Description: Will show no infection signs and symptoms  10/6/2021 0930 by Magdaleno Sifuentes RN  Outcome: Ongoing  10/5/2021 2237 by Dalton Clark RN  Outcome: Ongoing  10/5/2021 1946 by Aracelis Watts RN  Outcome: Ongoing  Goal: Absence of new skin breakdown  Description: Absence of new skin breakdown  10/6/2021 0930 by Magdaleno Sifuentes RN  Outcome: Ongoing  10/5/2021 2237 by Dalton Clark RN  Outcome: Ongoing  10/5/2021 1946 by Aracelis Watts RN  Outcome: Ongoing     Problem: Falls - Risk of:  Goal: Will remain free from falls  Description: Will remain free from falls  10/6/2021 0930 by Magdaleno Sifuentes RN  Outcome: Ongoing  10/5/2021 2237 by Dalton Clark RN  Outcome: Ongoing  10/5/2021 1946 by Aracelis Watts RN  Outcome: Ongoing  Goal: Absence of physical injury  Description: Absence of physical injury  10/6/2021 0930 by Magdaleno Sifuentes RN  Outcome: Ongoing  10/5/2021 2237 by Dalton Clark RN  Outcome: Ongoing  10/5/2021 1946 by Aracelis Watts RN  Outcome: Ongoing

## 2021-10-06 NOTE — PROGRESS NOTES
Pharmacy Vancomycin Consult     Vancomycin Day: 3  Current Dosing: Held secondary to elevated trough yesterday    Temp max:  98.1    Recent Labs     10/05/21  0448 10/06/21  0724   BUN 16 15       Recent Labs     10/05/21  0448 10/06/21  0724   CREATININE 0.3* 0.4*       Recent Labs     10/05/21  0448 10/06/21  0724   WBC 15.6* 13.3*         Intake/Output Summary (Last 24 hours) at 10/6/2021 0853  Last data filed at 10/5/2021 1704  Gross per 24 hour   Intake 75 ml   Output 1000 ml   Net -925 ml     Culture Date Source Results   10/04/21 Blood x 2 No growth to date   10/04/21 Urine Escherichia coli       Ht Readings from Last 1 Encounters:   10/04/21 5' 5\" (1.651 m)        Wt Readings from Last 1 Encounters:   10/04/21 151 lb (68.5 kg)         Body mass index is 25.13 kg/m². Estimated Creatinine Clearance: 140 mL/min (A) (based on SCr of 0.4 mg/dL (L)). Random: 14.2    Assessment/Plan: Initiate Vancomycin at 1 gm IV every 18 hours. Trough prior to 3rd dose. Predicted trough of 12.8 / AUC of 479. Thank you for the consult. Will continue to follow.      Electronically signed by Teresa Dominguez [address] [place] [address] on 10/6/2021 at 8:53 AM

## 2021-10-06 NOTE — PLAN OF CARE
Problem: Airway Clearance - Ineffective  Goal: Achieve or maintain patent airway  10/5/2021 2237 by Quoc Márquez RN  Outcome: Ongoing  10/5/2021 1946 by Justus Gasca RN  Outcome: Ongoing  10/5/2021 1154 by Justus Gasca RN  Outcome: Ongoing  10/5/2021 1153 by Justus Gasca RN  Outcome: Ongoing     Problem: Gas Exchange - Impaired  Goal: Absence of hypoxia  10/5/2021 2237 by Quoc Márquez RN  Outcome: Ongoing  10/5/2021 1946 by Justus Gasca RN  Outcome: Ongoing  10/5/2021 1154 by Justus Gasca RN  Outcome: Ongoing  10/5/2021 1153 by Justus Gasca RN  Outcome: Ongoing  Goal: Promote optimal lung function  10/5/2021 2237 by Quoc Márquez RN  Outcome: Ongoing  10/5/2021 1946 by Justus Gasca RN  Outcome: Ongoing  10/5/2021 1154 by Justus Gasca RN  Outcome: Ongoing  10/5/2021 1153 by Justus Gasca RN  Outcome: Ongoing     Problem: Breathing Pattern - Ineffective  Goal: Ability to achieve and maintain a regular respiratory rate  10/5/2021 2237 by Quoc Márquez RN  Outcome: Ongoing  10/5/2021 1946 by Justus Gasca RN  Outcome: Ongoing  10/5/2021 1154 by Justus Gasca RN  Outcome: Ongoing  10/5/2021 1153 by Justus Gasca RN  Outcome: Ongoing     Problem:  Body Temperature -  Risk of, Imbalanced  Goal: Ability to maintain a body temperature within defined limits  10/5/2021 2237 by Quoc Márquez RN  Outcome: Ongoing  10/5/2021 1946 by Justus Gasca RN  Outcome: Ongoing  10/5/2021 1154 by Justus Gasca RN  Outcome: Ongoing  10/5/2021 1153 by Justus Gasca RN  Outcome: Ongoing  Goal: Will regain or maintain usual level of consciousness  10/5/2021 2237 by Quoc Márquez RN  Outcome: Ongoing  10/5/2021 1946 by Justus Gasca RN  Outcome: Ongoing  10/5/2021 1154 by Justus Gasca RN  Outcome: Ongoing  10/5/2021 1153 by Justus Gasca RN  Outcome: Ongoing  Goal: Complications related to the disease process, condition or treatment will be avoided or minimized  10/5/2021 2237 by Merna Iqbal Bee Robert RN  Outcome: Ongoing  10/5/2021 1946 by Kayleen Larry RN  Outcome: Ongoing  10/5/2021 1154 by Kayleen Larry RN  Outcome: Ongoing  10/5/2021 1153 by Kayleen Larry RN  Outcome: Ongoing     Problem: Isolation Precautions - Risk of Spread of Infection  Goal: Prevent transmission of infection  10/5/2021 2237 by [place] RN  Outcome: Ongoing  10/5/2021 1946 by Kayleen Larry RN  Outcome: Ongoing  10/5/2021 1154 by Kayleen Larry RN  Outcome: Ongoing  10/5/2021 1153 by Kayleen Larry RN  Outcome: Ongoing     Problem: Nutrition Deficits  Goal: Optimize nutritional status  10/5/2021 2237 by [place] RN  Outcome: Ongoing  10/5/2021 1946 by Kayleen Larry RN  Outcome: Ongoing  10/5/2021 1154 by Kayleen Larry RN  Outcome: Ongoing  10/5/2021 1153 by Kayleen Larry RN  Outcome: Ongoing     Problem: Risk for Fluid Volume Deficit  Goal: Maintain normal heart rhythm  10/5/2021 2237 by [place] RN  Outcome: Ongoing  10/5/2021 1946 by Kayleen Larry RN  Outcome: Ongoing  10/5/2021 1154 by Kayleen Larry RN  Outcome: Ongoing  10/5/2021 1153 by Kayleen Larry RN  Outcome: Ongoing  Goal: Maintain absence of muscle cramping  10/5/2021 2237 by [place] RN  Outcome: Ongoing  10/5/2021 1946 by Kayleen Larry RN  Outcome: Ongoing  10/5/2021 1154 by Kayleen Larry RN  Outcome: Ongoing  10/5/2021 1153 by Kayleen Larry RN  Outcome: Ongoing  Goal: Maintain normal serum potassium, sodium, calcium, phosphorus, and pH  10/5/2021 2237 by [place] RN  Outcome: Ongoing  10/5/2021 1946 by Kayleen Larry RN  Outcome: Ongoing  10/5/2021 1154 by Kayleen Larry RN  Outcome: Ongoing  10/5/2021 1153 by Kayleen Larry RN  Outcome: Ongoing     Problem: Loneliness or Risk for Loneliness  Goal: Demonstrate positive use of time alone when socialization is not possible  10/5/2021 2237 by [place] Dorsey RN  Outcome: Ongoing  10/5/2021 1946 by Kayleen Larry RN  Outcome: Ongoing  10/5/2021 1154 by [facility] Scar Boles RN  Outcome: Ongoing  10/5/2021 1153 by Jolene Gonzalez RN  Outcome: Ongoing     Problem: Fatigue  Goal: Verbalize increase energy and improved vitality  10/5/2021 2237 by Lorie Frank RN  Outcome: Ongoing  10/5/2021 1946 by Jolene Gonzalez RN  Outcome: Ongoing  10/5/2021 1154 by Jolene Gonzalez RN  Outcome: Ongoing  10/5/2021 1153 by Jolene Gonzalez RN  Outcome: Ongoing     Problem: Patient Education: Go to Patient Education Activity  Goal: Patient/Family Education  10/5/2021 2237 by Lorie Frank RN  Outcome: Ongoing  10/5/2021 1946 by Jolene Gonzalez RN  Outcome: Ongoing  10/5/2021 1154 by Jolene Gonzalez RN  Outcome: Ongoing  10/5/2021 1153 by Jolene Gonzalez RN  Outcome: Ongoing     Problem: Skin Integrity:  Goal: Will show no infection signs and symptoms  Description: Will show no infection signs and symptoms  10/5/2021 2237 by Lorie Frank RN  Outcome: Ongoing  10/5/2021 1946 by Jolene Gonzalez RN  Outcome: Ongoing  10/5/2021 1154 by Jolene Gonzalez RN  Outcome: Ongoing  10/5/2021 1153 by Jolene Gonzalez RN  Outcome: Ongoing  Goal: Absence of new skin breakdown  Description: Absence of new skin breakdown  10/5/2021 2237 by Lorie Frank RN  Outcome: Ongoing  10/5/2021 1946 by Jolene Gonzalez RN  Outcome: Ongoing  10/5/2021 1154 by Jolene Gonzalez RN  Outcome: Ongoing  10/5/2021 1153 by Jolene Gonzalez RN  Outcome: Ongoing     Problem: Falls - Risk of:  Goal: Will remain free from falls  Description: Will remain free from falls  10/5/2021 2237 by Lorie Frank RN  Outcome: Ongoing  10/5/2021 1946 by Jolene Gonzalez RN  Outcome: Ongoing  10/5/2021 1154 by Jolene Gonzalez RN  Outcome: Ongoing  10/5/2021 1153 by Jolene Gonzalez RN  Outcome: Ongoing  Goal: Absence of physical injury  Description: Absence of physical injury  10/5/2021 2237 by Lorie Frank RN  Outcome: Ongoing  10/5/2021 1946 by Jolene Gonzalez RN  Outcome: Ongoing  10/5/2021 1154 by Jolene Gonzalez RN  Outcome: Ongoing  10/5/2021 1153 by Ruy Davis RN  Outcome: Ongoing

## 2021-10-06 NOTE — PROGRESS NOTES
Progress Note  Date:10/6/2021       Room:0429/429-02  Patient Name:Therese Diez     Date of Birth:[birth year]     Age:61 y.o. Subjective    Subjective: Assumed patient care 10/6/2021    13-year-old lady with a history of borderline personality, bipolar type I, hyperlipidemia, hypothyroidism, COPD, presenting from [facility] with concerns of hypoxia. Patient recently discharged from this facility 10/1/2021 after being treated for COVID-19. Patient has a history of noncompliance and has been noncompliant with supplemental oxygen. On admission required BiPAP on admission to the ICU, subsequently transitioned to high flow nasal cannula currently on 6 L and now on the medical surg floor. Patient is confused, has been intermittently removing supplemental oxygen, had transient hypoxia requiring increased in oxygen requirements and currently being weaned down. Review of Systems: Unable to fully obtain from patient. Objective         Vitals Last 24 Hours:  TEMPERATURE:  Temp  Av.4 °F (36.3 °C)  Min: 96.6 °F (35.9 °C)  Max: 97.9 °F (36.6 °C)  RESPIRATIONS RANGE: Resp  Av.7  Min: 18  Max: 20  PULSE OXIMETRY RANGE: SpO2  Av.5 %  Min: 87 %  Max: 99 %  PULSE RANGE: Pulse  Av.7  Min: 81  Max: 91  BLOOD PRESSURE RANGE: Systolic (87NCB), NXK:089 , Min:102 , OXA:944   ; Diastolic (06EMJ), ESV:69, Min:54, Max:66    I/O (24Hr): Intake/Output Summary (Last 24 hours) at 10/6/2021 1836  Last data filed at 10/6/2021 1825  Gross per 24 hour   Intake 720 ml   Output --   Net 720 ml       Physical Examination:  General: Confused, unable to follow commands. HEENT: Atraumatic normocephalic, range of motion normal  Cardiac: Normal S1-S2   Respiratory: No use of accessory muscles in breathing. Abdomen: Soft, positive bowel sounds in all quadrants, no distention, nontender to palpation.    Extremities: no tenderness, no edema, moves all extremities  Psych: Unable to fully assess        Labs/Imaging/Diagnostics Labs:  CBC:  Recent Labs     10/04/21  0455 10/05/21  0448 10/06/21  0724   WBC 19.9* 15.6* 13.3*   RBC 3.90* 3.70* 3.47*   HGB 13.1 12.4 11.6*   HCT 41.9 40.5 37.9   .4* 109.5* 109.2*   RDW 14.0 13.9 13.9    326 339     CHEMISTRIES:  Recent Labs     10/04/21  0455 10/04/21  0514 10/05/21  0448 10/06/21  0724     --  139 141   K 3.8 3.7 3.8 3.3*     --  102 101   CO2 25  --  27 33*   BUN 14  --  16 15   CREATININE 0.3*  --  0.3* 0.4*   GLUCOSE 101  --  76 66*   MG  --   --  1.4* 2.2     PT/INR:No results for input(s): PROTIME, INR in the last 72 hours. APTT:No results for input(s): APTT in the last 72 hours. LIVER PROFILE:  Recent Labs     10/04/21  0455 10/05/21  0448 10/06/21  0724   AST 22 16 20   ALT 12 10 11   BILITOT 0.8 0.8 0.9   ALKPHOS 69 61 59       Imaging Last 24 Hours:  No results found.   Assessment//Plan           Hospital Problems         Last Modified POA    Acute respiratory failure due to COVID-19 [facility]) 10/4/2021 Yes        Assessment & Plan    COVID-19 pneumonia  Discharged from this facility on 10/1/2021 s/p COVID-19 pneumonia treatment  Received steroids/baricitinib/adjuvant therapy/etc during that admission  Discharged to ECF on 2 L  ECF staff reports noncompliance with supplemental oxygen  Dexamethasone  Encouraged self proning  Empiric broad-spectrum antibiotics to cover potential secondary bacterial infection  Unknown vaccination status     Acute hypoxemic respiratory failure  Secondary to above  Since weaned off BiPAP  Currently on 6 L  History of COPD/prior tobacco dependence     E. coli UTI  History of ESBL E. coli/Enterococcus UTI  Broad-spectrum antibiotics while awaiting current sensitivities     Sepsis  Suspect secondary to above processes  Agents as above  Follow cultures  IVF as tolerated  Lactate improved     Multiple sclerosis  Agents on board     Bipolar disorder  Agents on board     Query dementia  Supportive care     DVT prophylaxis  Eliquis     Guardianship papers signed. [employer] work following. Guardianship hearing 10/7/2021. Electronically signed by   Naga Gaviria MD   Internal Medicine Hospitalist  On 10/6/2021  At 6:37 PM    EMR Dragon/Transcription disclaimer:   Much of this encounter note is an electronic transcription/translation of spoken language to printed text.  The electronic translation of spoken language may permit erroneous, or at times, nonsensical words or phrases to be inadvertently transcribed; although attempts have made to review the note for such errors, some may still exist.

## 2021-10-06 NOTE — PLAN OF CARE
Problem: Airway Clearance - Ineffective  Goal: Achieve or maintain patent airway  10/5/2021 1946 by Colin Cox RN  Outcome: Ongoing  10/5/2021 1154 by Colin Cox, RN  Outcome: Ongoing  10/5/2021 1153 by Colin Cox RN  Outcome: Ongoing     Problem: Gas Exchange - Impaired  Goal: Absence of hypoxia  10/5/2021 1946 by Colin Cox, RN  Outcome: Ongoing  10/5/2021 1154 by Colin Cox, RN  Outcome: Ongoing  10/5/2021 1153 by Colin Cox RN  Outcome: Ongoing  Goal: Promote optimal lung function  10/5/2021 1946 by Colin Cox, RN  Outcome: Ongoing  10/5/2021 1154 by Colin Cox, RN  Outcome: Ongoing  10/5/2021 1153 by Colin Cox RN  Outcome: Ongoing     Problem: Breathing Pattern - Ineffective  Goal: Ability to achieve and maintain a regular respiratory rate  10/5/2021 1946 by Colin Cox, RN  Outcome: Ongoing  10/5/2021 1154 by Colin Cox, RN  Outcome: Ongoing  10/5/2021 1153 by Colin Cox RN  Outcome: Ongoing     Problem:  Body Temperature -  Risk of, Imbalanced  Goal: Ability to maintain a body temperature within defined limits  10/5/2021 1946 by Colin Cox, RN  Outcome: Ongoing  10/5/2021 1154 by Colin Cox RN  Outcome: Ongoing  10/5/2021 1153 by Colin Cox RN  Outcome: Ongoing  Goal: Will regain or maintain usual level of consciousness  10/5/2021 1946 by Colin Cox, RN  Outcome: Ongoing  10/5/2021 1154 by Colin Cox RN  Outcome: Ongoing  10/5/2021 1153 by Colin Cox RN  Outcome: Ongoing  Goal: Complications related to the disease process, condition or treatment will be avoided or minimized  10/5/2021 1946 by Colin Cox, RN  Outcome: Ongoing  10/5/2021 1154 by Colin Cox RN  Outcome: Ongoing  10/5/2021 1153 by Colin Cox RN  Outcome: Ongoing     Problem: Isolation Precautions - Risk of Spread of Infection  Goal: Prevent transmission of infection  10/5/2021 1946 by Alonza Prime, RN  Outcome: Ongoing  10/5/2021 1154 by Carole Bland Reanna Daniels RN  Outcome: Ongoing  10/5/2021 1153 by Aracelis Watts RN  Outcome: Ongoing     Problem: Nutrition Deficits  Goal: Optimize nutritional status  10/5/2021 1946 by Aracelis Watts RN  Outcome: Ongoing  10/5/2021 1154 by Aracelis Watts RN  Outcome: Ongoing  10/5/2021 1153 by Aracelis Watts RN  Outcome: Ongoing     Problem: Risk for Fluid Volume Deficit  Goal: Maintain normal heart rhythm  10/5/2021 1946 by Aracelis Watts RN  Outcome: Ongoing  10/5/2021 1154 by Aracelis Watts RN  Outcome: Ongoing  10/5/2021 1153 by Aracelis Watts RN  Outcome: Ongoing  Goal: Maintain absence of muscle cramping  10/5/2021 1946 by Aracelis Watts RN  Outcome: Ongoing  10/5/2021 1154 by Aracelis Watts RN  Outcome: Ongoing  10/5/2021 1153 by Aracelis Watts RN  Outcome: Ongoing  Goal: Maintain normal serum potassium, sodium, calcium, phosphorus, and pH  10/5/2021 1946 by Aracelis Watts RN  Outcome: Ongoing  10/5/2021 1154 by Aracelis Watts RN  Outcome: Ongoing  10/5/2021 1153 by Aracelis Watts RN  Outcome: Ongoing     Problem: Loneliness or Risk for Loneliness  Goal: Demonstrate positive use of time alone when socialization is not possible  10/5/2021 1946 by Aracelis Watts RN  Outcome: Ongoing  10/5/2021 1154 by Aracelis Watts RN  Outcome: Ongoing  10/5/2021 1153 by Aracelis Watts RN  Outcome: Ongoing     Problem: Fatigue  Goal: Verbalize increase energy and improved vitality  10/5/2021 1946 by Aracelis Watts RN  Outcome: Ongoing  10/5/2021 1154 by Aracelis Watts RN  Outcome: Ongoing  10/5/2021 1153 by Aracelis Watts RN  Outcome: Ongoing     Problem: Patient Education: Go to Patient Education Activity  Goal: Patient/Family Education  10/5/2021 1946 by Aracelis Watts RN  Outcome: Ongoing  10/5/2021 1154 by Aracelis Watts RN  Outcome: Ongoing  10/5/2021 1153 by Aracelis Watts RN  Outcome: Ongoing     Problem: Skin Integrity:  Goal: Will show no infection signs and symptoms  10/5/2021 1946 by Aracelis Watts, RN  Outcome: Ongoing  10/5/2021 1154 by Brandie Bernal RN  Outcome: Ongoing  10/5/2021 1153 by Brandie Bernal RN  Outcome: Ongoing  Goal: Absence of new skin breakdown  10/5/2021 1946 by Brandie Bernal RN  Outcome: Ongoing  10/5/2021 1154 by Brandie Bernal RN  Outcome: Ongoing  10/5/2021 1153 by Brandie Bernal RN  Outcome: Ongoing     Problem: Falls - Risk of:  Goal: Will remain free from falls  10/5/2021 1946 by Brandie Bernal RN  Outcome: Ongoing  10/5/2021 1154 by Brandie Bernal RN  Outcome: Ongoing  10/5/2021 1153 by Brandie Bernal RN  Outcome: Ongoing  Goal: Absence of physical injury  10/5/2021 1946 by Brandie Bernal RN  Outcome: Ongoing  10/5/2021 1154 by Brandie Bernal RN  Outcome: Ongoing  10/5/2021 1153 by Brandie Bernal RN  Outcome: Ongoing

## 2021-10-06 NOTE — CONSULTS
**Physician Signature**  This document was electronically signed by: Scot Looney MD  10/05/2021   10:46 AM    **Consult Information**  Member Facility: [address] [place] [address] Drive MRN: [id number]  Visit/Encounter Number: [id number]  Consult ID: [id number]  Facility Time Zone: CT  Date and Time of Request: 10/05/2021 09:03 AM  CT  Requesting Clinician: Ramírez Summers MD  Patient Name: Toby Pastrana  Date of Birth:   Gender: Female  Patient identity was confirmed at the beginning of the consult with the   patient/family/staff using two personal identifiers: Patient name and       **Reason for Consult**  Reason for Consult: [facility]    **Admission**  Admission Date: 10-  Chief reason for ICU admission: COVID - 19  Secondary reasons for ICU admission: Respiratory Failure    **Core Metrics**  General orienting sentence for patient: 58 y/o F who was first positive for   COVID on . Admitted 10/3 for hypoxemia. She is on BiPAP, 30L. COVID +   . Originally on floor. d/c on 3L to NH. Returned to hospital on   10/4. Pt reportedly took off oxygen and couldn't get her saturations up. Originally on BiPAP but now on 5L O2 via NC.   Chief physiologic deterioration: None - Stable patient  Is the patient on DVT prophylaxis?: Yes  Prophylaxis type: Pharmacological  Is the patient on GI prophylaxis?: Yes  Gi Prophylaxis Comments: Pepcid  Has this patient reached their nutritional goal?: Yes  Nutritional Goals Details: Poor appetite  Are there current issues with pain management in this patient?:   No  Are there issues with skin integrity?: Yes and issues are being   addressed  Skin Integrity Details: Redness and abrasion on sacrum  Are there issues with delirium?: No  Delirium Comments: alert  Has the patient been mobilized?: No  Mobilized Details: L side contracted  Is this patient currently intubated?: No  Are there ethical or care philosophy or family issues?: No  Family Issues Details: Admitted from NH  Do you recommend an in depth evaluation?: No  Disposition Recommendations: Downgrade/transition out of ICU    **Inserted/Removed Devices**  Device Name: Correa Catheter  Site of insertion: Urethra  Date of insertion: 10-    **Physician Signature**  This document was electronically signed by: Tara Bergeron MD  10/05/2021   10:46 AM

## 2021-10-06 NOTE — PROGRESS NOTES
[facility]  Nurse  Consult Note       NAME:  Jeff Riggins  MEDICAL RECORD NUMBER:  [id number]  AGE: 59 y.o. GENDER: female  : 1960  TODAY'S DATE:  10/6/2021    Subjective   Reason for Wound Nurse Evaluation and Assessment: Coccyx Pressure Injury      Jeff Riggins is a 59 y.o. female referred by:   [x] Physician  [] Nursing  [] Other:     Wound Identification:  Wound Type: pressure  Contributing Factors: chronic pressure, decreased mobility and shear force    Wound History: Patient unsure how long wound has been there  Current Wound Care Treatment:  Santyl, Moist packing, silicone bordered dressing    Patient Goal of Care:  [x] Wound Healing  [] Odor Control  [] Palliative Care  [] Pain Control   [] Other:         PAST MEDICAL HISTORY        Diagnosis Date    Anxiety     Arthritis     B[facility]     Borderline personality disorder ([facility]     [facility]     GERD (gastroesophageal reflux disease)     Hyperlipidemia     Hypothyroidism     MS (multiple sclerosis) ([facility]     Palliative care patient 2021    Vitamin D deficiency        PAST SURGICAL HISTORY    History reviewed. No pertinent surgical history. FAMILY HISTORY    History reviewed. No pertinent family history. SOCIAL HISTORY    Social History     Tobacco Use    Smoking status: Former Smoker    Smokeless tobacco: Never Used   Substance Use Topics    Alcohol use: Not Currently    Drug use: Not Currently       ALLERGIES    No Known Allergies    MEDICATIONS    No current facility-administered medications on file prior to encounter. Current Outpatient Medications on File Prior to Encounter   Medication Sig Dispense Refill    Arginine 500 MG CAPS Take 500 mg by mouth daily      ammonium lactate (LAC-HYDRIN) 12 % lotion Apply topically as needed for Dry Skin Apply topically as needed.       Camphor-Menthol (ARCTIC RELIEF PAIN RELIEVING) 0.2-3.5 % GEL Apply topically 2 times daily as needed      albuterol sulfate  (90 Base) MCG/ACT inhaler Inhale 2 puffs into the lungs every 4 hours as needed for Wheezing or Shortness of Breath 18 g 0    budesonide-formoterol (SYMBICORT) 160-4.5 MCG/ACT AERO Inhale 2 puffs into the lungs 2 times daily 10.2 g 0    apixaban (ELIQUIS) 2.5 MG TABS tablet Take 1 tablet by mouth 2 times daily 60 tablet 0    Vitamin D (CHOLECALCIFEROL) 50 MCG (2000 UT) TABS tablet Take 1 tablet by mouth daily 30 tablet 0    dexamethasone (DECADRON) 2 MG tablet Take 3 tablets by mouth daily (with breakfast) for 5 days, THEN 2 tablets daily (with breakfast) for 5 days, THEN 1 tablet daily (with breakfast) for 5 days. 30 tablet 0    divalproex (DEPAKOTE) 250 MG DR tablet Take 2 tablets by mouth 3 times daily 90 tablet 0    FLUoxetine (PROZAC) 10 MG capsule Take 1 capsule by mouth daily 30 capsule 0    diphenhydrAMINE (BENADRYL) 25 MG capsule Take 25 mg by mouth every 6 hours as needed for Itching      Biotin 1 MG CAPS Take by mouth      busPIRone (BUSPAR) 5 MG tablet Take 5 mg by mouth 2 times daily       glatiramer (COPAXONE) 20 MG/ML injection Inject 20 mg into the skin daily       cyclobenzaprine (FLEXERIL) 10 MG tablet Take 10 mg by mouth 3 times daily as needed for Muscle spasms      famotidine (PEPCID) 20 MG tablet Take 20 mg by mouth daily       fenofibrate (TRICOR) 145 MG tablet Take 145 mg by mouth daily      gabapentin (NEURONTIN) 300 MG capsule Take 300 mg by mouth 3 times daily.  levothyroxine (SYNTHROID) 75 MCG tablet Take 75 mcg by mouth Daily      methylphenidate (RITALIN) 10 MG tablet Take 10 mg by mouth 2 times daily.       traZODone (DESYREL) 50 MG tablet Take 50 mg by mouth nightly      calcium carbonate (TUMS) 500 MG chewable tablet Take 1 tablet by mouth 3 times daily as needed       calcium-vitamin D (OSCAL-500) 500-200 MG-UNIT per tablet Take 1 tablet by mouth 2 times daily Calcium carbonate-vitamin D3 600mg-400unit      docusate sodium (COLACE) 100 MG capsule Take 100 mg by mouth daily      ibuprofen (ADVIL;MOTRIN) 600 MG tablet Take 600 mg by mouth every 12 hours as needed for Pain      cadexomer iodine (IODOSORB) 0.9 % gel Apply topically daily Apply topically daily as needed. Objective    BP (!) 105/59   Pulse 91   Temp 97.9 °F (36.6 °C) (Temporal)   Resp 20   Ht 5' 5\" (1.651 m)   Wt 151 lb (68.5 kg)   SpO2 94%   BMI 25.13 kg/m²     LABS:  WBC:    Lab Results   Component Value Date    WBC 13.3 10/06/2021     H/H:    Lab Results   Component Value Date    HGB 11.6 10/06/2021    HCT 37.9 10/06/2021     PTT:    Lab Results   Component Value Date    APTT 44.0 09/28/2021    PTT 30.6 05/19/2015   [APTT}  PT/INR:    Lab Results   Component Value Date    PROTIME 14.4 09/28/2021    INR 1.10 09/28/2021     HgBA1c:  No results found for: LABA1C    Assessment   Tamir Risk Score: Tamir Scale Score: 14    Patient Active Problem List   Diagnosis Code    Pneumonia due to COVID-19 virus U07.1, J12.82    Acute respiratory failure with hypoxia (HCC) J96.01    COVID-19 U07.1    Hx of multiple sclerosis ([facility] G35    Bipolar disorder ([facility] F31.9    COPD (chronic obstructive pulmonary disease) ([facility] J44.9    Hyperlipidemia E78.5    Anxiety F41.9    Palliative care patient Z51.5    Acute respiratory failure due to COVID-19 ([facility] U07.1, J96.00       Measurements:  Wound 10/05/21 Coccyx (Active)   Wound Image   10/06/21 1547   Wound Etiology Pressure Stage  4 10/06/21 1547   Dressing Status New dressing applied 10/06/21 1547   Wound Cleansed Soap and water 10/06/21 1547   Dressing/Treatment Pharmaceutical agent (see MAR); Moist to dry;Silicone border 46/35/66 1547   Dressing Change Due 10/07/21 10/06/21 1547   Wound Length (cm) 3 cm 10/06/21 1547   Wound Width (cm) 1.2 cm 10/06/21 1547   Wound Depth (cm) 1 cm 10/06/21 1547   Wound Surface Area (cm^2) 3.6 cm^2 10/06/21 1547   Wound Volume (cm^3) 3.6 cm^3 10/06/21 1547 Undermining Starts ___ O'Clock 4 10/06/21 1547   Undermining Ends___ O'Clock 7 10/06/21 1547   Undermining Maxium Distance (cm) 2.5 10/06/21 1547   Wound Assessment Exposed structure fascia; Exposed structure muscle;Pink/red 10/06/21 1547   Drainage Amount Small 10/06/21 1547   Drainage Description Serosanguinous 10/06/21 1547   Odor None 10/06/21 1547   Magaly-wound Assessment Intact 10/06/21 1547   Margins Defined edges 10/06/21 1547   Number of days: 1            Response to treatment:  Well tolerated by patient. Pain Assessment:  Severity:  3 / 10  Quality of pain: tender  Wound Pain Timing/Severity: intermittent  Premedicated: No    Plan   Plan of Care: Wound 10/05/21 Coccyx-Dressing/Treatment: Pharmaceutical agent (see MAR), Moist to dry, Silicone border    Specialty Bed Required : Yes   [x] Low Air Loss   [] Pressure Redistribution  [] Fluid Immersion  [] Bariatric  [] Total Pressure Relief  [] Other:     Current Diet: ADULT DIET; Dysphagia - Minced and Moist  Dietician consult:  No    Discharge Plan:  Placement for patient upon discharge: skilled nursing    Patient appropriate for Outpatient [address]: Yes    Referrals:  []   [] [facility] [place] [facility]  [] Supplies  [] Other    Patient/Caregiver Teaching:  Level of patient/caregiver understanding able to:   [] Indicates understanding       [] Needs reinforcement  [] Unsuccessful      [] Verbal Understanding  [] Demonstrated understanding       [] No evidence of learning  [] Refused teaching         [x] N/A       Patient admitted with stage 4 pressure injury at the coccyx. Wound and periwound skin cleaned with soap and water. Wound depth and undermining filled with NS moistened gauze. Primary nurse to apply Santyl at next dressing change. Recommendation:    COCCYX: Clean wound bed and 4-6 inches of the surrounding skin with soap and water. Pat dry. Apply skin prep to periwound skin. Apply Santyl to wound bed.  Fill depth and undermining of wound with NS moistened packing gauze. Cover with sacral foam dressing. Change daily and prn soiling, saturation, or dislodgement.     Electronically signed by   Vinayak Farmer RN, [facility] 10/6/2021

## 2021-10-06 NOTE — PROGRESS NOTES
Report given to Michell. Patient brought to room 429 with telemetry monitor intact. Patient documents given to RN. Patient resting comfortably in bed with no current needs.      Electronically signed by Deepthi Tinsley RN on 10/5/2021 at 7:43 PM

## 2021-10-06 NOTE — PROGRESS NOTES
Notified Amalia De Santiago, patients sister, of new room.      Electronically signed by Brandie Bernal RN on 10/5/2021 at 8:25 PM

## 2021-10-07 PROCEDURE — 6370000000 HC RX 637 (ALT 250 FOR IP): Performed by: HOSPITALIST

## 2021-10-07 PROCEDURE — 1210000000 HC MED SURG R&B

## 2021-10-07 PROCEDURE — 2580000003 HC RX 258: Performed by: HOSPITALIST

## 2021-10-07 PROCEDURE — 2580000003 HC RX 258: Performed by: INTERNAL MEDICINE

## 2021-10-07 PROCEDURE — 6360000002 HC RX W HCPCS: Performed by: INTERNAL MEDICINE

## 2021-10-07 PROCEDURE — 6360000002 HC RX W HCPCS: Performed by: HOSPITALIST

## 2021-10-07 PROCEDURE — 2700000000 HC OXYGEN THERAPY PER DAY

## 2021-10-07 PROCEDURE — 6370000000 HC RX 637 (ALT 250 FOR IP): Performed by: INTERNAL MEDICINE

## 2021-10-07 PROCEDURE — 94761 N-INVAS EAR/PLS OXIMETRY MLT: CPT

## 2021-10-07 RX ORDER — HALOPERIDOL 5 MG/ML
2 INJECTION INTRAMUSCULAR EVERY 6 HOURS PRN
Status: DISCONTINUED | OUTPATIENT
Start: 2021-10-07 | End: 2021-10-09

## 2021-10-07 RX ORDER — DIVALPROEX SODIUM 125 MG/1
500 CAPSULE, COATED PELLETS ORAL EVERY 8 HOURS SCHEDULED
Status: DISCONTINUED | OUTPATIENT
Start: 2021-10-07 | End: 2021-10-10 | Stop reason: HOSPADM

## 2021-10-07 RX ADMIN — APIXABAN 2.5 MG: 2.5 TABLET, FILM COATED ORAL at 20:28

## 2021-10-07 RX ADMIN — HALOPERIDOL LACTATE 2 MG: 5 INJECTION, SOLUTION INTRAMUSCULAR at 22:41

## 2021-10-07 RX ADMIN — Medication 1 TABLET: at 20:28

## 2021-10-07 RX ADMIN — Medication 1 TABLET: at 10:23

## 2021-10-07 RX ADMIN — BUSPIRONE HYDROCHLORIDE 5 MG: 5 TABLET ORAL at 20:29

## 2021-10-07 RX ADMIN — Medication 2000 UNITS: at 10:22

## 2021-10-07 RX ADMIN — APIXABAN 2.5 MG: 2.5 TABLET, FILM COATED ORAL at 10:22

## 2021-10-07 RX ADMIN — METHYLPHENIDATE HYDROCHLORIDE 10 MG: 5 TABLET ORAL at 10:22

## 2021-10-07 RX ADMIN — MEROPENEM 1000 MG: 1 INJECTION, POWDER, FOR SOLUTION INTRAVENOUS at 04:40

## 2021-10-07 RX ADMIN — FAMOTIDINE 20 MG: 20 TABLET ORAL at 10:21

## 2021-10-07 RX ADMIN — GABAPENTIN 300 MG: 300 CAPSULE ORAL at 10:22

## 2021-10-07 RX ADMIN — CYCLOBENZAPRINE 10 MG: 10 TABLET, FILM COATED ORAL at 13:41

## 2021-10-07 RX ADMIN — VANCOMYCIN HYDROCHLORIDE 1000 MG: 10 INJECTION, POWDER, LYOPHILIZED, FOR SOLUTION INTRAVENOUS at 04:40

## 2021-10-07 RX ADMIN — COLLAGENASE SANTYL: 250 OINTMENT TOPICAL at 10:20

## 2021-10-07 RX ADMIN — HALOPERIDOL LACTATE 2 MG: 5 INJECTION, SOLUTION INTRAMUSCULAR at 16:41

## 2021-10-07 RX ADMIN — GABAPENTIN 300 MG: 300 CAPSULE ORAL at 20:29

## 2021-10-07 RX ADMIN — DEXAMETHASONE SODIUM PHOSPHATE 6 MG: 10 INJECTION INTRAMUSCULAR; INTRAVENOUS at 10:22

## 2021-10-07 RX ADMIN — DIVALPROEX SODIUM 500 MG: 125 CAPSULE, COATED PELLETS ORAL at 10:23

## 2021-10-07 RX ADMIN — BUSPIRONE HYDROCHLORIDE 5 MG: 5 TABLET ORAL at 10:21

## 2021-10-07 RX ADMIN — DIVALPROEX SODIUM 500 MG: 125 CAPSULE, COATED PELLETS ORAL at 13:41

## 2021-10-07 RX ADMIN — WATER 1000 MG: 1 INJECTION INTRAMUSCULAR; INTRAVENOUS; SUBCUTANEOUS at 10:22

## 2021-10-07 RX ADMIN — FENOFIBRATE 160 MG: 160 TABLET ORAL at 10:23

## 2021-10-07 RX ADMIN — FLUOXETINE 10 MG: 10 CAPSULE ORAL at 10:21

## 2021-10-07 RX ADMIN — LEVOTHYROXINE SODIUM 75 MCG: 25 TABLET ORAL at 06:05

## 2021-10-07 RX ADMIN — GLATIRAMER ACETATE 20 MG: 20 INJECTION, SOLUTION SUBCUTANEOUS at 10:21

## 2021-10-07 RX ADMIN — DIVALPROEX SODIUM 500 MG: 125 CAPSULE, COATED PELLETS ORAL at 20:32

## 2021-10-07 RX ADMIN — GABAPENTIN 300 MG: 300 CAPSULE ORAL at 13:41

## 2021-10-07 RX ADMIN — TRAZODONE HYDROCHLORIDE 50 MG: 50 TABLET ORAL at 20:28

## 2021-10-07 RX ADMIN — METHYLPHENIDATE HYDROCHLORIDE 10 MG: 5 TABLET ORAL at 17:09

## 2021-10-07 RX ADMIN — ACETAMINOPHEN 650 MG: 325 TABLET ORAL at 13:41

## 2021-10-07 ASSESSMENT — PAIN SCALES - GENERAL
PAINLEVEL_OUTOF10: 4
PAINLEVEL_OUTOF10: 3

## 2021-10-07 NOTE — CARE COORDINATION
Return to [facility] [place].    [place] [id number]  F  Electronically signed by Pranay Sosa on 10/7/2021 at 7:45 AM

## 2021-10-07 NOTE — PLAN OF CARE
Problem: Airway Clearance - Ineffective  Goal: Achieve or maintain patent airway  10/6/2021 2143 by Babar Hudson RN  Outcome: Ongoing  10/6/2021 0930 by Kale Thurston RN  Outcome: Ongoing     Problem: Gas Exchange - Impaired  Goal: Absence of hypoxia  10/6/2021 2143 by Babar Hudson RN  Outcome: Ongoing  10/6/2021 0930 by Kale Thurston RN  Outcome: Ongoing  Goal: Promote optimal lung function  10/6/2021 2143 by Babar Hudson RN  Outcome: Ongoing  10/6/2021 0930 by Kale Thurston RN  Outcome: Ongoing     Problem: Breathing Pattern - Ineffective  Goal: Ability to achieve and maintain a regular respiratory rate  10/6/2021 2143 by Babar Hudson RN  Outcome: Ongoing  10/6/2021 0930 by Kale Thurston RN  Outcome: Ongoing     Problem:  Body Temperature -  Risk of, Imbalanced  Goal: Ability to maintain a body temperature within defined limits  10/6/2021 2143 by Babar Hudson RN  Outcome: Ongoing  10/6/2021 0930 by Kale Thurston RN  Outcome: Ongoing  Goal: Will regain or maintain usual level of consciousness  10/6/2021 2143 by Babar Hudson RN  Outcome: Ongoing  10/6/2021 0930 by Kale Thurston RN  Outcome: Ongoing  Goal: Complications related to the disease process, condition or treatment will be avoided or minimized  10/6/2021 2143 by Babar Hudson RN  Outcome: Ongoing  10/6/2021 0930 by Kale Thurston RN  Outcome: Ongoing     Problem: Isolation Precautions - Risk of Spread of Infection  Goal: Prevent transmission of infection  10/6/2021 2143 by Babar Hudson RN  Outcome: Ongoing  10/6/2021 0930 by Kale Thurston RN  Outcome: Ongoing     Problem: Nutrition Deficits  Goal: Optimize nutritional status  10/6/2021 2143 by Babar Hudson RN  Outcome: Ongoing  10/6/2021 0930 by Kale Thurston RN  Outcome: Ongoing     Problem: Risk for Fluid Volume Deficit  Goal: Maintain normal heart rhythm  10/6/2021 2143 by Babar Hudson RN  Outcome: Ongoing  10/6/2021 0930 by Kale Thurston RN  Outcome: Ongoing  Goal: Maintain absence of muscle cramping  10/6/2021 2143 by Danita Barker RN  Outcome: Ongoing  10/6/2021 0930 by Fernando Kline RN  Outcome: Ongoing  Goal: Maintain normal serum potassium, sodium, calcium, phosphorus, and pH  10/6/2021 2143 by Danita Barker RN  Outcome: Ongoing  10/6/2021 0930 by Fernando Kline RN  Outcome: Ongoing     Problem: Loneliness or Risk for Loneliness  Goal: Demonstrate positive use of time alone when socialization is not possible  10/6/2021 2143 by Danita Barker RN  Outcome: Ongoing  10/6/2021 0930 by Fernando Kline RN  Outcome: Ongoing     Problem: Fatigue  Goal: Verbalize increase energy and improved vitality  10/6/2021 2143 by Danita Barker RN  Outcome: Ongoing  10/6/2021 0930 by Fernando Kline RN  Outcome: Ongoing     Problem: Patient Education: Go to Patient Education Activity  Goal: Patient/Family Education  10/6/2021 2143 by Danita Barker RN  Outcome: Ongoing  10/6/2021 0930 by Fernando Kline RN  Outcome: Ongoing     Problem: Skin Integrity:  Goal: Will show no infection signs and symptoms  Description: Will show no infection signs and symptoms  10/6/2021 2143 by Danita Barker RN  Outcome: Ongoing  10/6/2021 0930 by Fernando Kline RN  Outcome: Ongoing  Goal: Absence of new skin breakdown  Description: Absence of new skin breakdown  10/6/2021 2143 by Danita Barker RN  Outcome: Ongoing  10/6/2021 0930 by Fernando Kline RN  Outcome: Ongoing     Problem: Falls - Risk of:  Goal: Will remain free from falls  Description: Will remain free from falls  10/6/2021 2143 by Danita Barker RN  Outcome: Ongoing  10/6/2021 0930 by Fernando Kline RN  Outcome: Ongoing  Goal: Absence of physical injury  Description: Absence of physical injury  10/6/2021 2143 by Danita Barker RN  Outcome: Ongoing  10/6/2021 0930 by Fernando Kline RN  Outcome: Ongoing

## 2021-10-07 NOTE — PROGRESS NOTES
Progress Note  Date:10/7/2021       Room:0429/429-02  Patient Name:Therese Long     Date of Birth:[birth year]     Age:61 y.o. Subjective    Subjective: Assumed patient care 10/6/2021    53-year-old lady with a history of borderline personality, bipolar type I, hyperlipidemia, hypothyroidism, COPD, presenting from [facility] with concerns of hypoxia. Patient recently discharged from this facility 10/1/2021 after being treated for COVID-19. Patient has a history of noncompliance and has been noncompliant with supplemental oxygen. On admission required BiPAP on admission to the ICU, subsequently transitioned to high flow nasal cannula currently on 6 L and now on the medical surg floor. Patient is confused, has been intermittently removing supplemental oxygen, had transient hypoxia requiring increased in oxygen requirements and currently being weaned down. Review of Systems: Unable to fully obtain from patient. Objective         Vitals Last 24 Hours:  TEMPERATURE:  Temp  Av °F (36.1 °C)  Min: 96.3 °F (35.7 °C)  Max: 97.9 °F (36.6 °C)  RESPIRATIONS RANGE: Resp  Av.2  Min: 16  Max: 24  PULSE OXIMETRY RANGE: SpO2  Av.8 %  Min: 83 %  Max: 100 %  PULSE RANGE: Pulse  Av.4  Min: 70  Max: 90  BLOOD PRESSURE RANGE: Systolic (08PTF), QCH:026 , Min:90 , TR   ; Diastolic (07GNY), TYD:10, Min:45, Max:54    I/O (24Hr): Intake/Output Summary (Last 24 hours) at 10/7/2021 1441  Last data filed at 10/7/2021 1405  Gross per 24 hour   Intake 1080 ml   Output 400 ml   Net 680 ml       Physical Examination:  General: Confused, unable to follow commands. HEENT: Atraumatic normocephalic, range of motion normal  Cardiac: Normal S1-S2   Respiratory: No use of accessory muscles in breathing. Abdomen: Soft, positive bowel sounds in all quadrants, no distention, nontender to palpation.    Extremities: no tenderness, no edema, moves all extremities  Psych: Unable to fully assess        Labs/Imaging/Diagnostics    Labs:  CBC:  Recent Labs     10/05/21  0448 10/06/21  0724   WBC 15.6* 13.3*   RBC 3.70* 3.47*   HGB 12.4 11.6*   HCT 40.5 37.9   .5* 109.2*   RDW 13.9 13.9    339     CHEMISTRIES:  Recent Labs     10/05/21  0448 10/06/21  0724    141   K 3.8 3.3*    101   CO2 27 33*   BUN 16 15   CREATININE 0.3* 0.4*   GLUCOSE 76 66*   MG 1.4* 2.2     PT/INR:No results for input(s): PROTIME, INR in the last 72 hours. APTT:No results for input(s): APTT in the last 72 hours. LIVER PROFILE:  Recent Labs     10/05/21  0448 10/06/21  0724   AST 16 20   ALT 10 11   BILITOT 0.8 0.9   ALKPHOS 61 59       Imaging Last 24 Hours:  No results found. Assessment//Plan           Hospital Problems         Last Modified POA    Acute respiratory failure due to COVID-19 [facility]) 10/4/2021 Yes        Assessment & Plan      COVID-19 pneumonia  Discharged from this facility on 10/1/2021 s/p COVID-19 pneumonia treatment  Received steroids/baricitinib/adjuvant therapy/etc during that admission  Discharged to F on 2 L  [facility] staff reports noncompliance with supplemental oxygen  Dexamethasone  Encouraged self proning  Initially on Empiric broad-spectrum antibiotics to cover potential secondary bacterial infection, now on ceftriaxone  Unknown vaccination status     Acute hypoxemic respiratory failure  Secondary to above  Since weaned off BiPAP  Currently on 6 L  History of COPD/prior tobacco dependence     E. coli UTI  History of ESBL E. coli/Enterococcus UTI  Initially on Broad-spectrum antibiotics, now on ceftriaxone      Sepsis  Suspect secondary to above processes  Agents as above  Follow cultures  IVF as tolerated  Lactate improved     Multiple sclerosis  Agents on board     Bipolar disorder  Agents on board     Query dementia - Supportive care  Haldol PRN for agitation     DVT prophylaxis - Eliquis         Guardianship papers signed. Rachna Villalobos work following.   Guardianship hearing 10/7/2021      Electronically signed by   Chago Shah MD   Internal Medicine Hospitalist  On 10/7/2021  At 2:41 PM    EMR Dragon/Transcription disclaimer:   Much of this encounter note is an electronic transcription/translation of spoken language to printed text.  The electronic translation of spoken language may permit erroneous, or at times, nonsensical words or phrases to be inadvertently transcribed; although attempts have made to review the note for such errors, some may still exist.

## 2021-10-08 PROCEDURE — 6370000000 HC RX 637 (ALT 250 FOR IP): Performed by: HOSPITALIST

## 2021-10-08 PROCEDURE — 6360000002 HC RX W HCPCS: Performed by: HOSPITALIST

## 2021-10-08 PROCEDURE — 2580000003 HC RX 258: Performed by: HOSPITALIST

## 2021-10-08 PROCEDURE — 6360000002 HC RX W HCPCS: Performed by: INTERNAL MEDICINE

## 2021-10-08 PROCEDURE — 94761 N-INVAS EAR/PLS OXIMETRY MLT: CPT

## 2021-10-08 PROCEDURE — 6370000000 HC RX 637 (ALT 250 FOR IP): Performed by: INTERNAL MEDICINE

## 2021-10-08 PROCEDURE — 1210000000 HC MED SURG R&B

## 2021-10-08 PROCEDURE — 2700000000 HC OXYGEN THERAPY PER DAY

## 2021-10-08 RX ADMIN — Medication 1 TABLET: at 19:47

## 2021-10-08 RX ADMIN — DIVALPROEX SODIUM 500 MG: 125 CAPSULE, COATED PELLETS ORAL at 14:35

## 2021-10-08 RX ADMIN — Medication 1 TABLET: at 10:56

## 2021-10-08 RX ADMIN — FAMOTIDINE 20 MG: 20 TABLET ORAL at 10:56

## 2021-10-08 RX ADMIN — GABAPENTIN 300 MG: 300 CAPSULE ORAL at 10:56

## 2021-10-08 RX ADMIN — METHYLPHENIDATE HYDROCHLORIDE 10 MG: 5 TABLET ORAL at 10:56

## 2021-10-08 RX ADMIN — APIXABAN 2.5 MG: 2.5 TABLET, FILM COATED ORAL at 10:56

## 2021-10-08 RX ADMIN — FENOFIBRATE 160 MG: 160 TABLET ORAL at 10:56

## 2021-10-08 RX ADMIN — DIVALPROEX SODIUM 500 MG: 125 CAPSULE, COATED PELLETS ORAL at 05:53

## 2021-10-08 RX ADMIN — FLUOXETINE 10 MG: 10 CAPSULE ORAL at 10:56

## 2021-10-08 RX ADMIN — BUDESONIDE AND FORMOTEROL FUMARATE DIHYDRATE 2 PUFF: 160; 4.5 AEROSOL RESPIRATORY (INHALATION) at 19:49

## 2021-10-08 RX ADMIN — GABAPENTIN 300 MG: 300 CAPSULE ORAL at 14:35

## 2021-10-08 RX ADMIN — Medication 2000 UNITS: at 10:56

## 2021-10-08 RX ADMIN — WATER 1000 MG: 1 INJECTION INTRAMUSCULAR; INTRAVENOUS; SUBCUTANEOUS at 10:55

## 2021-10-08 RX ADMIN — BUSPIRONE HYDROCHLORIDE 5 MG: 5 TABLET ORAL at 10:56

## 2021-10-08 RX ADMIN — ACETAMINOPHEN 650 MG: 325 TABLET ORAL at 19:47

## 2021-10-08 RX ADMIN — METHYLPHENIDATE HYDROCHLORIDE 10 MG: 5 TABLET ORAL at 16:41

## 2021-10-08 RX ADMIN — BUSPIRONE HYDROCHLORIDE 5 MG: 5 TABLET ORAL at 19:47

## 2021-10-08 RX ADMIN — DIVALPROEX SODIUM 500 MG: 125 CAPSULE, COATED PELLETS ORAL at 21:30

## 2021-10-08 RX ADMIN — DOCUSATE SODIUM 100 MG: 100 CAPSULE ORAL at 10:56

## 2021-10-08 RX ADMIN — APIXABAN 2.5 MG: 2.5 TABLET, FILM COATED ORAL at 19:46

## 2021-10-08 RX ADMIN — DEXAMETHASONE SODIUM PHOSPHATE 6 MG: 10 INJECTION INTRAMUSCULAR; INTRAVENOUS at 11:39

## 2021-10-08 RX ADMIN — COLLAGENASE SANTYL: 250 OINTMENT TOPICAL at 15:10

## 2021-10-08 RX ADMIN — GLATIRAMER ACETATE 20 MG: 20 INJECTION, SOLUTION SUBCUTANEOUS at 10:56

## 2021-10-08 RX ADMIN — CYCLOBENZAPRINE 10 MG: 10 TABLET, FILM COATED ORAL at 16:41

## 2021-10-08 RX ADMIN — LEVOTHYROXINE SODIUM 75 MCG: 25 TABLET ORAL at 05:53

## 2021-10-08 RX ADMIN — TRAZODONE HYDROCHLORIDE 50 MG: 50 TABLET ORAL at 21:25

## 2021-10-08 RX ADMIN — GABAPENTIN 300 MG: 300 CAPSULE ORAL at 21:25

## 2021-10-08 ASSESSMENT — PAIN SCALES - GENERAL
PAINLEVEL_OUTOF10: 0
PAINLEVEL_OUTOF10: 8
PAINLEVEL_OUTOF10: 7

## 2021-10-08 ASSESSMENT — PAIN DESCRIPTION - DESCRIPTORS: DESCRIPTORS: PATIENT UNABLE TO DESCRIBE

## 2021-10-08 ASSESSMENT — PAIN DESCRIPTION - ONSET: ONSET: ON-GOING

## 2021-10-08 ASSESSMENT — PAIN DESCRIPTION - ORIENTATION: ORIENTATION: OTHER (COMMENT)

## 2021-10-08 ASSESSMENT — PAIN DESCRIPTION - PROGRESSION: CLINICAL_PROGRESSION: NOT CHANGED

## 2021-10-08 ASSESSMENT — PAIN DESCRIPTION - LOCATION: LOCATION: GENERALIZED

## 2021-10-08 ASSESSMENT — PAIN DESCRIPTION - FREQUENCY: FREQUENCY: CONTINUOUS

## 2021-10-08 ASSESSMENT — PAIN DESCRIPTION - PAIN TYPE: TYPE: CHRONIC PAIN

## 2021-10-08 ASSESSMENT — PAIN - FUNCTIONAL ASSESSMENT: PAIN_FUNCTIONAL_ASSESSMENT: PREVENTS OR INTERFERES SOME ACTIVE ACTIVITIES AND ADLS

## 2021-10-08 NOTE — PROGRESS NOTES
Patient still refusing medications. Resting comfortably. Will reassess at a later time.  Electronically signed by Jim Guillen RN on 10/8/2021 at 10:14 AM

## 2021-10-08 NOTE — PROGRESS NOTES
Dressing change on coccyx. Wound and surrounding area reddened. Scant drainage noted, serosanguineous. No smell noted. Patient had little to no discomfort. Omepilex placed on inner ankles where there was reddness and some breakdown.  Electronically signed by Kristina Lacy RN on 10/8/2021 at 3:14 PM

## 2021-10-08 NOTE — PROGRESS NOTES
Patient having active hallucinations about  and baby. Screaming, \"Get off me,\" \"help me,\"  and \"you are hurting me. \" She was reoriented and repositioned, seemed to calm her down.  Electronically signed by Kale Thurston RN on 10/8/2021 at 3:34 PM

## 2021-10-08 NOTE — PROGRESS NOTES
Progress Note  Date:10/8/2021       Room:0429/429-02  Patient Name:Therese Vo     Date of Birth:[birth year]     Age:61 y.o. Subjective    Subjective: Assumed patient care 10/6/2021    68-year-old lady with a history of borderline personality, bipolar type I, hyperlipidemia, hypothyroidism, COPD, presenting from [facility] with concerns of hypoxia. Patient recently discharged from this facility 10/1/2021 after being treated for COVID-19. Patient has a history of noncompliance and has been noncompliant with supplemental oxygen. On admission required BiPAP on admission to the ICU, subsequently transitioned to high flow nasal cannula currently on 3L and now on the medical surg floor. Patient is confused, has been intermittently removing supplemental oxygen as well as refusing meds, required some Haldol yesterday secondary to agitation and hard to reorient. This morning was awake on entering the room however during discussion patient is close eye on refused to converse. Review of Systems: Unable to fully obtain from patient. Objective         Vitals Last 24 Hours:  TEMPERATURE:  Temp  Av.1 °F (36.2 °C)  Min: 96.1 °F (35.6 °C)  Max: 97.8 °F (36.6 °C)  RESPIRATIONS RANGE: Resp  Av.7  Min: 16  Max: 20  PULSE OXIMETRY RANGE: SpO2  Av.7 %  Min: 90 %  Max: 96 %  PULSE RANGE: Pulse  Av.4  Min: 61  Max: 105  BLOOD PRESSURE RANGE: Systolic (84ZIX), TA , Min:90 , XOF:793   ; Diastolic (72KHI), EGA:41, Min:32, Max:63    I/O (24Hr): Intake/Output Summary (Last 24 hours) at 10/8/2021 1313  Last data filed at 10/8/2021 0939  Gross per 24 hour   Intake 600 ml   Output 1350 ml   Net -750 ml       Physical Examination:  General: Refused to converse/communicate at this time. HEENT: Atraumatic normocephalic, range of motion normal  Cardiac: Normal S1-S2   Respiratory: No use of accessory muscles in breathing.   Abdomen: Soft, positive bowel sounds in all quadrants, no distention, nontender to over the weekend. Electronically signed by   Tino Sarmiento MD   Internal Medicine Hospitalist  On 10/8/2021  At 1:13 PM    EMR Dragon/Transcription disclaimer:   Much of this encounter note is an electronic transcription/translation of spoken language to printed text.  The electronic translation of spoken language may permit erroneous, or at times, nonsensical words or phrases to be inadvertently transcribed; although attempts have made to review the note for such errors, some may still exist.

## 2021-10-08 NOTE — PROGRESS NOTES
Patient asked to take medications at a later time. Resting comfortably. Will reevaluate at a later time.  Electronically signed by Stone Gupta RN on 10/8/2021 at 10:13 AM

## 2021-10-09 LAB
ANION GAP SERPL CALCULATED.3IONS-SCNC: 10 MMOL/L (ref 7–19)
BLOOD CULTURE, ROUTINE: NORMAL
BUN BLDV-MCNC: 8 MG/DL (ref 8–23)
CALCIUM SERPL-MCNC: 9 MG/DL (ref 8.8–10.2)
CHLORIDE BLD-SCNC: 103 MMOL/L (ref 98–111)
CO2: 27 MMOL/L (ref 22–29)
CREAT SERPL-MCNC: 0.3 MG/DL (ref 0.5–0.9)
CULTURE, BLOOD 2: NORMAL
GFR AFRICAN AMERICAN: >59
GFR NON-AFRICAN AMERICAN: >60
GLUCOSE BLD-MCNC: 83 MG/DL (ref 74–109)
MAGNESIUM: 1.4 MG/DL (ref 1.6–2.4)
POTASSIUM REFLEX MAGNESIUM: 3.4 MMOL/L (ref 3.5–5)
SODIUM BLD-SCNC: 140 MMOL/L (ref 136–145)

## 2021-10-09 PROCEDURE — 6370000000 HC RX 637 (ALT 250 FOR IP): Performed by: INTERNAL MEDICINE

## 2021-10-09 PROCEDURE — 6360000002 HC RX W HCPCS: Performed by: INTERNAL MEDICINE

## 2021-10-09 PROCEDURE — 2580000003 HC RX 258: Performed by: HOSPITALIST

## 2021-10-09 PROCEDURE — 2700000000 HC OXYGEN THERAPY PER DAY

## 2021-10-09 PROCEDURE — 36415 COLL VENOUS BLD VENIPUNCTURE: CPT

## 2021-10-09 PROCEDURE — 6360000002 HC RX W HCPCS: Performed by: HOSPITALIST

## 2021-10-09 PROCEDURE — 1210000000 HC MED SURG R&B

## 2021-10-09 PROCEDURE — 83735 ASSAY OF MAGNESIUM: CPT

## 2021-10-09 PROCEDURE — 80048 BASIC METABOLIC PNL TOTAL CA: CPT

## 2021-10-09 PROCEDURE — 6370000000 HC RX 637 (ALT 250 FOR IP): Performed by: HOSPITALIST

## 2021-10-09 PROCEDURE — 94761 N-INVAS EAR/PLS OXIMETRY MLT: CPT

## 2021-10-09 RX ORDER — HALOPERIDOL 5 MG/ML
2 INJECTION INTRAMUSCULAR ONCE
Status: COMPLETED | OUTPATIENT
Start: 2021-10-09 | End: 2021-10-09

## 2021-10-09 RX ORDER — HYDROCODONE BITARTRATE AND ACETAMINOPHEN 5; 325 MG/1; MG/1
1 TABLET ORAL EVERY 6 HOURS PRN
Status: DISCONTINUED | OUTPATIENT
Start: 2021-10-09 | End: 2021-10-10 | Stop reason: HOSPADM

## 2021-10-09 RX ORDER — QUETIAPINE FUMARATE 25 MG/1
12.5 TABLET, FILM COATED ORAL NIGHTLY
Status: DISCONTINUED | OUTPATIENT
Start: 2021-10-09 | End: 2021-10-10 | Stop reason: HOSPADM

## 2021-10-09 RX ADMIN — BUSPIRONE HYDROCHLORIDE 5 MG: 5 TABLET ORAL at 20:42

## 2021-10-09 RX ADMIN — DIVALPROEX SODIUM 500 MG: 125 CAPSULE, COATED PELLETS ORAL at 05:35

## 2021-10-09 RX ADMIN — WATER 1000 MG: 1 INJECTION INTRAMUSCULAR; INTRAVENOUS; SUBCUTANEOUS at 07:57

## 2021-10-09 RX ADMIN — FLUOXETINE 10 MG: 10 CAPSULE ORAL at 07:56

## 2021-10-09 RX ADMIN — METHYLPHENIDATE HYDROCHLORIDE 10 MG: 5 TABLET ORAL at 17:36

## 2021-10-09 RX ADMIN — GABAPENTIN 300 MG: 300 CAPSULE ORAL at 14:28

## 2021-10-09 RX ADMIN — GABAPENTIN 300 MG: 300 CAPSULE ORAL at 07:56

## 2021-10-09 RX ADMIN — COLLAGENASE SANTYL: 250 OINTMENT TOPICAL at 14:34

## 2021-10-09 RX ADMIN — CYCLOBENZAPRINE 10 MG: 10 TABLET, FILM COATED ORAL at 05:43

## 2021-10-09 RX ADMIN — GABAPENTIN 300 MG: 300 CAPSULE ORAL at 20:42

## 2021-10-09 RX ADMIN — APIXABAN 2.5 MG: 2.5 TABLET, FILM COATED ORAL at 07:56

## 2021-10-09 RX ADMIN — MAGNESIUM SULFATE HEPTAHYDRATE 2000 MG: 40 INJECTION, SOLUTION INTRAVENOUS at 10:39

## 2021-10-09 RX ADMIN — CYCLOBENZAPRINE 10 MG: 10 TABLET, FILM COATED ORAL at 22:30

## 2021-10-09 RX ADMIN — FAMOTIDINE 20 MG: 20 TABLET ORAL at 07:55

## 2021-10-09 RX ADMIN — BUDESONIDE AND FORMOTEROL FUMARATE DIHYDRATE 2 PUFF: 160; 4.5 AEROSOL RESPIRATORY (INHALATION) at 19:59

## 2021-10-09 RX ADMIN — LEVOTHYROXINE SODIUM 75 MCG: 25 TABLET ORAL at 05:35

## 2021-10-09 RX ADMIN — Medication 1 TABLET: at 07:56

## 2021-10-09 RX ADMIN — Medication 1 TABLET: at 20:42

## 2021-10-09 RX ADMIN — TRAZODONE HYDROCHLORIDE 50 MG: 50 TABLET ORAL at 20:42

## 2021-10-09 RX ADMIN — LEVOTHYROXINE SODIUM 75 MCG: 25 TABLET ORAL at 07:55

## 2021-10-09 RX ADMIN — BUDESONIDE AND FORMOTEROL FUMARATE DIHYDRATE 2 PUFF: 160; 4.5 AEROSOL RESPIRATORY (INHALATION) at 07:57

## 2021-10-09 RX ADMIN — DEXAMETHASONE SODIUM PHOSPHATE 6 MG: 10 INJECTION INTRAMUSCULAR; INTRAVENOUS at 10:39

## 2021-10-09 RX ADMIN — HYDROCODONE BITARTRATE AND ACETAMINOPHEN 1 TABLET: 5; 325 TABLET ORAL at 17:00

## 2021-10-09 RX ADMIN — HYDROCODONE BITARTRATE AND ACETAMINOPHEN 1 TABLET: 5; 325 TABLET ORAL at 22:30

## 2021-10-09 RX ADMIN — BUSPIRONE HYDROCHLORIDE 5 MG: 5 TABLET ORAL at 07:56

## 2021-10-09 RX ADMIN — APIXABAN 2.5 MG: 2.5 TABLET, FILM COATED ORAL at 20:42

## 2021-10-09 RX ADMIN — Medication 2000 UNITS: at 07:56

## 2021-10-09 RX ADMIN — DIVALPROEX SODIUM 500 MG: 125 CAPSULE, COATED PELLETS ORAL at 14:28

## 2021-10-09 RX ADMIN — CYCLOBENZAPRINE 10 MG: 10 TABLET, FILM COATED ORAL at 14:28

## 2021-10-09 RX ADMIN — GLATIRAMER ACETATE 20 MG: 20 INJECTION, SOLUTION SUBCUTANEOUS at 07:57

## 2021-10-09 RX ADMIN — DIVALPROEX SODIUM 500 MG: 125 CAPSULE, COATED PELLETS ORAL at 22:28

## 2021-10-09 RX ADMIN — FENOFIBRATE 160 MG: 160 TABLET ORAL at 07:56

## 2021-10-09 RX ADMIN — HALOPERIDOL LACTATE 2 MG: 5 INJECTION, SOLUTION INTRAMUSCULAR at 17:36

## 2021-10-09 RX ADMIN — ACETAMINOPHEN 650 MG: 325 TABLET ORAL at 05:43

## 2021-10-09 RX ADMIN — DOCUSATE SODIUM 100 MG: 100 CAPSULE ORAL at 07:55

## 2021-10-09 RX ADMIN — QUETIAPINE FUMARATE 12.5 MG: 25 TABLET ORAL at 20:42

## 2021-10-09 RX ADMIN — METHYLPHENIDATE HYDROCHLORIDE 10 MG: 5 TABLET ORAL at 07:55

## 2021-10-09 ASSESSMENT — PAIN DESCRIPTION - ORIENTATION
ORIENTATION: OTHER (COMMENT)
ORIENTATION: OTHER (COMMENT)

## 2021-10-09 ASSESSMENT — PAIN SCALES - GENERAL
PAINLEVEL_OUTOF10: 2
PAINLEVEL_OUTOF10: 7
PAINLEVEL_OUTOF10: 3
PAINLEVEL_OUTOF10: 0
PAINLEVEL_OUTOF10: 9

## 2021-10-09 ASSESSMENT — PAIN DESCRIPTION - PROGRESSION
CLINICAL_PROGRESSION: NOT CHANGED
CLINICAL_PROGRESSION: NOT CHANGED

## 2021-10-09 ASSESSMENT — PAIN DESCRIPTION - DESCRIPTORS
DESCRIPTORS: PATIENT UNABLE TO DESCRIBE
DESCRIPTORS: PATIENT UNABLE TO DESCRIBE

## 2021-10-09 ASSESSMENT — PAIN DESCRIPTION - LOCATION
LOCATION: GENERALIZED
LOCATION: GENERALIZED

## 2021-10-09 ASSESSMENT — PAIN DESCRIPTION - PAIN TYPE
TYPE: CHRONIC PAIN
TYPE: CHRONIC PAIN

## 2021-10-09 ASSESSMENT — PAIN DESCRIPTION - FREQUENCY
FREQUENCY: CONTINUOUS
FREQUENCY: CONTINUOUS

## 2021-10-09 ASSESSMENT — PAIN - FUNCTIONAL ASSESSMENT
PAIN_FUNCTIONAL_ASSESSMENT: PREVENTS OR INTERFERES SOME ACTIVE ACTIVITIES AND ADLS
PAIN_FUNCTIONAL_ASSESSMENT: PREVENTS OR INTERFERES SOME ACTIVE ACTIVITIES AND ADLS

## 2021-10-09 ASSESSMENT — PAIN DESCRIPTION - ONSET
ONSET: ON-GOING
ONSET: ON-GOING

## 2021-10-09 NOTE — PLAN OF CARE
Problem: Airway Clearance - Ineffective  Goal: Achieve or maintain patent airway  10/9/2021 1527 by Jessica Barcenas RN  Outcome: Ongoing  10/9/2021 0304 by Cathy Benjamin LPN  Outcome: Ongoing     Problem: Gas Exchange - Impaired  Goal: Absence of hypoxia  10/9/2021 1527 by Jessica Barcenas RN  Outcome: Ongoing  10/9/2021 0304 by Cathy Benjamin LPN  Outcome: Ongoing  Goal: Promote optimal lung function  10/9/2021 1527 by Jessica Barcenas RN  Outcome: Ongoing  10/9/2021 0304 by Cathy Benjamin LPN  Outcome: Ongoing     Problem: Breathing Pattern - Ineffective  Goal: Ability to achieve and maintain a regular respiratory rate  10/9/2021 1527 by Jessica Barcenas RN  Outcome: Ongoing  10/9/2021 0304 by Cathy Benjamin LPN  Outcome: Ongoing     Problem:  Body Temperature -  Risk of, Imbalanced  Goal: Ability to maintain a body temperature within defined limits  10/9/2021 1527 by Jessica Barcenas RN  Outcome: Ongoing  10/9/2021 0304 by Cathy Benjamin LPN  Outcome: Ongoing  Goal: Will regain or maintain usual level of consciousness  10/9/2021 1527 by Jessica Barcenas RN  Outcome: Ongoing  10/9/2021 0304 by Cathy Benjamin LPN  Outcome: Ongoing  Goal: Complications related to the disease process, condition or treatment will be avoided or minimized  10/9/2021 1527 by Jessica Barcenas RN  Outcome: Ongoing  10/9/2021 0304 by Cathy Benjamin LPN  Outcome: Ongoing     Problem: Isolation Precautions - Risk of Spread of Infection  Goal: Prevent transmission of infection  10/9/2021 1527 by Jessica Barcenas RN  Outcome: Ongoing  10/9/2021 0304 by Cathy Benjamin LPN  Outcome: Ongoing     Problem: Nutrition Deficits  Goal: Optimize nutritional status  10/9/2021 1527 by Jessica Barcenas RN  Outcome: Ongoing  10/9/2021 0304 by Cathy Benjamin LPN  Outcome: Ongoing     Problem: Risk for Fluid Volume Deficit  Goal: Maintain normal heart rhythm  10/9/2021 1527 by Jessica Barcenas RN  Outcome: Ongoing  10/9/2021 0304 by Cathy Benjamin LPN  Outcome: Ongoing  Goal: Maintain absence of muscle cramping  10/9/2021 1527 by Bhumika Louis RN  Outcome: Ongoing  10/9/2021 0304 by Ana Camargo LPN  Outcome: Ongoing  Goal: Maintain normal serum potassium, sodium, calcium, phosphorus, and pH  10/9/2021 1527 by Bhumika Louis RN  Outcome: Ongoing  10/9/2021 0304 by Ana Camargo LPN  Outcome: Ongoing     Problem: Loneliness or Risk for Loneliness  Goal: Demonstrate positive use of time alone when socialization is not possible  10/9/2021 1527 by Bhumika Louis RN  Outcome: Ongoing  10/9/2021 0304 by Ana Camargo LPN  Outcome: Ongoing     Problem: Fatigue  Goal: Verbalize increase energy and improved vitality  10/9/2021 1527 by Bhumika Louis RN  Outcome: Ongoing  10/9/2021 0304 by Ana Camargo LPN  Outcome: Ongoing     Problem: Patient Education: Go to Patient Education Activity  Goal: Patient/Family Education  10/9/2021 1527 by Bhumika Louis RN  Outcome: Ongoing  10/9/2021 0304 by Ana Camargo LPN  Outcome: Ongoing     Problem: Skin Integrity:  Goal: Will show no infection signs and symptoms  Description: Will show no infection signs and symptoms  10/9/2021 1527 by Bhumika Louis RN  Outcome: Ongoing  10/9/2021 0304 by Ana Camargo LPN  Outcome: Ongoing  Goal: Absence of new skin breakdown  Description: Absence of new skin breakdown  10/9/2021 1527 by Bhumika Louis RN  Outcome: Ongoing  10/9/2021 0304 by Ana Camargo LPN  Outcome: Ongoing     Problem: Falls - Risk of:  Goal: Will remain free from falls  Description: Will remain free from falls  10/9/2021 1527 by Bhumika Louis RN  Outcome: Ongoing  10/9/2021 0304 by Ana Camargo LPN  Outcome: Ongoing  Goal: Absence of physical injury  Description: Absence of physical injury  10/9/2021 1527 by Bhumika Louis RN  Outcome: Ongoing  10/9/2021 0304 by Ana Camargo LPN  Outcome: Ongoing     Problem: Pain:  Goal: Pain level will decrease  Description: Pain level will decrease  10/9/2021 1527 by Charlann Moritz, RN  Outcome: Ongoing  10/9/2021 0304 by Linette Cheng LPN  Outcome: Ongoing  Goal: Control of acute pain  Description: Control of acute pain  10/9/2021 1527 by Charlann Moritz, RN  Outcome: Ongoing  10/9/2021 0304 by Linette Cheng LPN  Outcome: Ongoing  Goal: Control of chronic pain  Description: Control of chronic pain  10/9/2021 1527 by Charlann Moritz, RN  Outcome: Ongoing  10/9/2021 0304 by Linette Cheng LPN  Outcome: Ongoing

## 2021-10-09 NOTE — PROGRESS NOTES
12 hour chart check review completed. Electronically signed by Manuel Villalobos LPN on 80/4/9497 at 3:72 AM

## 2021-10-09 NOTE — PROGRESS NOTES
Progress Note  Date:10/9/2021       Room:0429/429-02  Patient Name:Therese Smith     Date of Birth:[birth year]     Age:61 y.o. Subjective    Subjective: Assumed patient care 10/6/2021    77-year-old lady with a history of borderline personality, bipolar type I, hyperlipidemia, hypothyroidism, COPD, presenting from [facility] with concerns of hypoxia. Patient recently discharged from this facility 10/1/2021 after being treated for COVID-19. Patient has a history of noncompliance and has been noncompliant with supplemental oxygen. On admission required BiPAP on admission to the ICU, subsequently transitioned to high flow nasal cannula currently on 2L and now on the medical surg floor. Patient is confused, has been intermittently removing supplemental oxygen as well as refusing meds, however no acute overnight events. Review of Systems: Unable to fully obtain from patient. Objective         Vitals Last 24 Hours:  TEMPERATURE:  Temp  Av.5 °F (35.8 °C)  Min: 95.6 °F (35.3 °C)  Max: 97.2 °F (36.2 °C)  RESPIRATIONS RANGE: Resp  Av.3  Min: 17  Max: 20  PULSE OXIMETRY RANGE: SpO2  Av %  Min: 92 %  Max: 100 %  PULSE RANGE: Pulse  Av  Min: 63  Max: 107  BLOOD PRESSURE RANGE: Systolic (30CRC), GNO:340 , Min:99 , CWM:013   ; Diastolic (39EVF), QAS:68, Min:64, Max:74    I/O (24Hr): Intake/Output Summary (Last 24 hours) at 10/9/2021 1440  Last data filed at 10/9/2021 1334  Gross per 24 hour   Intake 1828 ml   Output 3200 ml   Net -1372 ml       Physical Examination:  General: Refused to converse/communicate at this time. HEENT: Atraumatic normocephalic, range of motion normal  Cardiac: Normal S1-S2   Respiratory: No use of accessory muscles in breathing. Abdomen: Soft, positive bowel sounds in all quadrants, no distention, nontender to palpation.    Extremities: no tenderness, no edema, moves all extremities  Psych: Unable to fully assess        Labs/Imaging/Diagnostics    Labs:  CBC:  No results for input(s): WBC, RBC, HGB, HCT, MCV, RDW, PLT in the last 72 hours. CHEMISTRIES:  Recent Labs     10/09/21  0818      K 3.4*      CO2 27   BUN 8   CREATININE 0.3*   GLUCOSE 83   MG 1.4*     PT/INR:No results for input(s): PROTIME, INR in the last 72 hours. APTT:No results for input(s): APTT in the last 72 hours. LIVER PROFILE:  No results for input(s): AST, ALT, BILIDIR, BILITOT, ALKPHOS in the last 72 hours. Imaging Last 24 Hours:  No results found. Assessment//Plan           Hospital Problems         Last Modified POA    Acute respiratory failure due to COVID-19 [facility]) 10/4/2021 Yes        Assessment & Plan      COVID-19 pneumonia  Discharged from this facility on 10/1/2021 s/p COVID-19 pneumonia treatment  Received steroids/baricitinib/adjuvant therapy/etc during that admission  Discharged to ECF on 2 L  ECF staff reports noncompliance with supplemental oxygen  Dexamethasone  Encouraged self proning  Initially on Empiric broad-spectrum antibiotics to cover potential secondary bacterial infection  Unknown vaccination status     Acute hypoxemic respiratory failure  Secondary to above  Since weaned off BiPAP  Currently on 2L  History of COPD/prior tobacco dependence     E. coli UTI  History of ESBL E. coli/Enterococcus UTI  Initially on Broad-spectrum antibiotics, now on ceftriaxone      Sepsis  Suspect secondary to above processes  Agents as above  Follow cultures  IVF as tolerated  Lactate improved     Multiple sclerosis  Agents on board     Bipolar disorder  Agents on board     Query dementia - Supportive care  Haldol PRN for agitation       DVT prophylaxis - Eliquis  Disposition: SNF tomorrow      Guardianship papers signed. Claudean Hilding work following. Guardianship hearing 10/7/2021, likely return back to SNF over the weekend.       Electronically signed by   Nestor Montenegro MD   Internal Medicine Hospitalist  On 10/9/2021  At 2:40 PM    EMR Dragon/Transcription disclaimer:   Much of this encounter note is an electronic transcription/translation of spoken language to printed text.  The electronic translation of spoken language may permit erroneous, or at times, nonsensical words or phrases to be inadvertently transcribed; although attempts have made to review the note for such errors, some may still exist.

## 2021-10-09 NOTE — PROGRESS NOTES
Pt is very agitated and confused. She states that she is scared. She says \"my baby is on my back and I can't get him off. He is hurting me and has strength. I just need some air. \" She was also referring to a \"Amarjit\" who she thought was in the room with her. Pain medication was given. PCA and this nurse repositioned patient, used TV as a distraction, and reassurance that she is safe. Pt continued to press call light and stated \"I am the last person to mess with. I will call a . \"    Physician notified. Awaiting further orders. Will continue to monitor. Electronically signed by Elverna Pallas, RN on 10/9/2021 at 5:19 PM      2mg IV Haldol was given once. Pt is resting in bed watching television. No complaints of pain or anxiety.     Electronically signed by Elverna Pallas, RN on 10/9/2021 at 6:35 PM

## 2021-10-10 VITALS
RESPIRATION RATE: 16 BRPM | SYSTOLIC BLOOD PRESSURE: 112 MMHG | TEMPERATURE: 96 F | WEIGHT: 131.19 LBS | OXYGEN SATURATION: 95 % | DIASTOLIC BLOOD PRESSURE: 54 MMHG | HEART RATE: 84 BPM | BODY MASS INDEX: 21.86 KG/M2 | HEIGHT: 65 IN

## 2021-10-10 PROBLEM — J96.01 ACUTE HYPOXEMIC RESPIRATORY FAILURE (HCC): Status: ACTIVE | Noted: 2021-10-10

## 2021-10-10 PROCEDURE — 6370000000 HC RX 637 (ALT 250 FOR IP): Performed by: HOSPITALIST

## 2021-10-10 PROCEDURE — 6360000002 HC RX W HCPCS: Performed by: INTERNAL MEDICINE

## 2021-10-10 PROCEDURE — 2700000000 HC OXYGEN THERAPY PER DAY

## 2021-10-10 PROCEDURE — 6370000000 HC RX 637 (ALT 250 FOR IP): Performed by: INTERNAL MEDICINE

## 2021-10-10 RX ORDER — METHYLPHENIDATE HYDROCHLORIDE 10 MG/1
10 TABLET ORAL 2 TIMES DAILY
Qty: 6 TABLET | Refills: 0 | Status: SHIPPED | OUTPATIENT
Start: 2021-10-10 | End: 2021-10-13

## 2021-10-10 RX ORDER — QUETIAPINE FUMARATE 25 MG/1
12.5 TABLET, FILM COATED ORAL NIGHTLY
Qty: 60 TABLET | Refills: 3
Start: 2021-10-10

## 2021-10-10 RX ORDER — GABAPENTIN 300 MG/1
300 CAPSULE ORAL 3 TIMES DAILY
Qty: 9 CAPSULE | Refills: 0 | Status: SHIPPED | OUTPATIENT
Start: 2021-10-10 | End: 2021-10-13

## 2021-10-10 RX ADMIN — METHYLPHENIDATE HYDROCHLORIDE 10 MG: 5 TABLET ORAL at 09:09

## 2021-10-10 RX ADMIN — APIXABAN 2.5 MG: 2.5 TABLET, FILM COATED ORAL at 09:09

## 2021-10-10 RX ADMIN — BUDESONIDE AND FORMOTEROL FUMARATE DIHYDRATE 2 PUFF: 160; 4.5 AEROSOL RESPIRATORY (INHALATION) at 08:15

## 2021-10-10 RX ADMIN — BUSPIRONE HYDROCHLORIDE 5 MG: 5 TABLET ORAL at 09:09

## 2021-10-10 RX ADMIN — GABAPENTIN 300 MG: 300 CAPSULE ORAL at 09:09

## 2021-10-10 RX ADMIN — LEVOTHYROXINE SODIUM 75 MCG: 25 TABLET ORAL at 06:29

## 2021-10-10 RX ADMIN — DIVALPROEX SODIUM 500 MG: 125 CAPSULE, COATED PELLETS ORAL at 06:29

## 2021-10-10 RX ADMIN — DEXAMETHASONE SODIUM PHOSPHATE 6 MG: 10 INJECTION INTRAMUSCULAR; INTRAVENOUS at 09:16

## 2021-10-10 RX ADMIN — DOCUSATE SODIUM 100 MG: 100 CAPSULE ORAL at 09:09

## 2021-10-10 RX ADMIN — Medication 1 TABLET: at 09:09

## 2021-10-10 RX ADMIN — FAMOTIDINE 20 MG: 20 TABLET ORAL at 09:09

## 2021-10-10 RX ADMIN — Medication 2000 UNITS: at 09:09

## 2021-10-10 RX ADMIN — GLATIRAMER ACETATE 20 MG: 20 INJECTION, SOLUTION SUBCUTANEOUS at 09:08

## 2021-10-10 RX ADMIN — FENOFIBRATE 160 MG: 160 TABLET ORAL at 09:09

## 2021-10-10 RX ADMIN — FLUOXETINE 10 MG: 10 CAPSULE ORAL at 09:09

## 2021-10-10 RX ADMIN — COLLAGENASE SANTYL: 250 OINTMENT TOPICAL at 09:17

## 2021-10-10 NOTE — PROGRESS NOTES
12 hour chart check review completed. Electronically signed by Selma Bunch LPN on 90/37/3498 at 12:01 AM

## 2021-10-10 NOTE — DISCHARGE SUMMARY
Discharge Summary      Date:10/10/2021        Patient Name:Therese Cedillo     Date of Birth:[birth year]     Age:61 y.o. Admit Date:10/4/2021   Admission Condition:fair   Discharged Condition:fair  Discharge Date: 10/10/21         Discharge Diagnoses   Active Problems:    Acute respiratory failure due to COVID-19 [facility])    Acute hypoxemic respiratory failure (HCC)  Resolved Problems:    * No resolved hospital problems. [facility] Stay   Narrative of Hospital Course:     42-year-old lady with a history of borderline personality, bipolar type I, hyperlipidemia, hypothyroidism, COPD, presenting from [facility] with concerns of hypoxia. Patient recently discharged from this facility 10/1/2021 after being treated for COVID-19. Patient has a history of noncompliance and has been noncompliant with supplemental oxygen at the facility. On admission required BiPAP on admission to the ICU, subsequently transitioned to high flow nasal cannula currently on 2L and now on the medical surg floor. Patient with intermittent confusion and agitation, initiated on low dose seroquel at night. Completed antibioitcs course for possible superimposed bacteria infection in setting of COVID pNA however less likely. Completed course of antibiotics for UTI also. Discharged back to SNF in stable condition. Physical Examination:  General: alert and conversant in NAD. HEENT: Atraumatic normocephalic, range of motion normal  Cardiac: Normal S1-S2   Respiratory: No use of accessory muscles in breathing. Abdomen: Soft, positive bowel sounds in all quadrants, no distention, nontender to palpation. Extremities: no tenderness, no edema, moves all extremities  Psych: alert, good mood. Consultants:   None    Time Spent on Discharge:  35 minutes were spent in patient examination, evaluation, counseling as well as medication reconciliation, prescriptions for required medications, discharge plan and follow up.       Surgeries/Procedures Performed:  NONE    Significant Diagnostic Studies:   Recent Labs:  CBC:   Lab Results   Component Value Date    WBC 13.3 10/06/2021    RBC 3.47 10/06/2021    HGB 11.6 10/06/2021    HCT 37.9 10/06/2021    .2 10/06/2021    MCH 33.4 10/06/2021    MCHC 30.6 10/06/2021    RDW 13.9 10/06/2021     10/06/2021     BMP:    Lab Results   Component Value Date    GLUCOSE 83 10/09/2021     10/09/2021    K 3.4 10/09/2021     10/09/2021    CO2 27 10/09/2021    ANIONGAP 10 10/09/2021    BUN 8 10/09/2021    CREATININE 0.3 10/09/2021    CALCIUM 9.0 10/09/2021    LABGLOM >60 10/09/2021    GFRAA >59 10/09/2021       Radiology Last 7 Days:  XR CHEST PORTABLE    Result Date: 10/4/2021  Left lung infiltrate and a small bibasal pleural effusion. Signed by Dr Ruthie Yu      Discharge Plan   [facility]    Provider Follow-Up:   No follow-up provider specified.        Patient Instructions   Diet: regular diet    Activity: activity as tolerated      Discharge Medications         Medication List      START taking these medications    QUEtiapine 25 MG tablet  Commonly known as: SEROQUEL  Take 0.5 tablets by mouth nightly        CONTINUE taking these medications    albuterol sulfate  (90 Base) MCG/ACT inhaler  Inhale 2 puffs into the lungs every 4 hours as needed for Wheezing or Shortness of Breath     ammonium lactate 12 % lotion  Commonly known as: LAC-HYDRIN     apixaban 2.5 MG Tabs tablet  Commonly known as: Eliquis  Take 1 tablet by mouth 2 times daily     Arctic Relief Pain Relieving 0.2-3.5 % Gel  Generic drug: Camphor-Menthol     Arginine 500 MG Caps     Biotin 1 MG Caps     budesonide-formoterol 160-4.5 MCG/ACT Aero  Commonly known as: SYMBICORT  Inhale 2 puffs into the lungs 2 times daily     busPIRone 5 MG tablet  Commonly known as: BUSPAR     cadexomer iodine 0.9 % gel  Commonly known as: IODOSORB     calcium carbonate 500 MG chewable tablet  Commonly known as: TUMS calcium-vitamin D 500-200 MG-UNIT per tablet  Commonly known as: OSCAL-500     Copaxone 20 MG/ML injection  Generic drug: glatiramer     cyclobenzaprine 10 MG tablet  Commonly known as: FLEXERIL     dexamethasone 2 MG tablet  Commonly known as: DECADRON  Take 3 tablets by mouth daily (with breakfast) for 5 days, THEN 2 tablets daily (with breakfast) for 5 days, THEN 1 tablet daily (with breakfast) for 5 days.   Start taking on: October 1, 2021     diphenhydrAMINE 25 MG capsule  Commonly known as: BENADRYL     divalproex 250 MG DR tablet  Commonly known as: DEPAKOTE  Take 2 tablets by mouth 3 times daily     docusate sodium 100 MG capsule  Commonly known as: COLACE     famotidine 20 MG tablet  Commonly known as: PEPCID     fenofibrate 145 MG tablet  Commonly known as: TRICOR     FLUoxetine 10 MG capsule  Commonly known as: PROZAC  Take 1 capsule by mouth daily     gabapentin 300 MG capsule  Commonly known as: NEURONTIN     ibuprofen 600 MG tablet  Commonly known as: ADVIL;MOTRIN     levothyroxine 75 MCG tablet  Commonly known as: SYNTHROID     methylphenidate 10 MG tablet  Commonly known as: RITALIN     traZODone 50 MG tablet  Commonly known as: DESYREL     vitamin D 50 MCG (2000 UT) Tabs tablet  Commonly known as: CHOLECALCIFEROL  Take 1 tablet by mouth daily        STOP taking these medications    HYDROcodone-acetaminophen 7.5-325 MG per tablet  Commonly known as: [address]           Where to Get Your Medications      Information about where to get these medications is not yet available    Ask your nurse or doctor about these medications  · QUEtiapine 25 MG tablet         Electronically signed by Christopher Liu MD on 10/10/21 at 10:38 AM CDT

## 2021-10-10 NOTE — PLAN OF CARE
Problem: Airway Clearance - Ineffective  Goal: Achieve or maintain patent airway  10/10/2021 0325 by Ramirez Osborn LPN  Outcome: Ongoing  10/9/2021 1527 by Coby Allen RN  Outcome: Ongoing     Problem: Gas Exchange - Impaired  Goal: Absence of hypoxia  10/10/2021 0325 by Ramirez Osborn LPN  Outcome: Ongoing  10/9/2021 1527 by Coby Allen RN  Outcome: Ongoing  Goal: Promote optimal lung function  10/10/2021 0325 by Ramirez Osborn LPN  Outcome: Ongoing  10/9/2021 1527 by Coby Allen RN  Outcome: Ongoing     Problem: Breathing Pattern - Ineffective  Goal: Ability to achieve and maintain a regular respiratory rate  10/10/2021 0325 by Ramirez Osborn LPN  Outcome: Ongoing  10/9/2021 1527 by Coby Allen RN  Outcome: Ongoing     Problem:  Body Temperature -  Risk of, Imbalanced  Goal: Ability to maintain a body temperature within defined limits  10/10/2021 0325 by Ramirez Osborn LPN  Outcome: Ongoing  10/9/2021 1527 by Coby Allen RN  Outcome: Ongoing  Goal: Will regain or maintain usual level of consciousness  10/10/2021 0325 by Ramirez Osborn LPN  Outcome: Ongoing  10/9/2021 1527 by Coby Allen RN  Outcome: Ongoing  Goal: Complications related to the disease process, condition or treatment will be avoided or minimized  10/10/2021 0325 by Ramirez Osborn LPN  Outcome: Ongoing  10/9/2021 1527 by Coby Allen RN  Outcome: Ongoing     Problem: Isolation Precautions - Risk of Spread of Infection  Goal: Prevent transmission of infection  10/10/2021 0325 by Ramirez Osborn LPN  Outcome: Ongoing  10/9/2021 1527 by Coby Allen RN  Outcome: Ongoing     Problem: Nutrition Deficits  Goal: Optimize nutritional status  10/10/2021 0325 by Ramirez Osborn LPN  Outcome: Ongoing  10/9/2021 1527 by Coby Allen RN  Outcome: Ongoing     Problem: Risk for Fluid Volume Deficit  Goal: Maintain normal heart rhythm  10/10/2021 0325 by Ramirez Osborn LPN  Outcome: Ongoing  10/9/2021 1527 by Antonia Lenz RN  Outcome: Ongoing  Goal: Maintain absence of muscle cramping  10/10/2021 0325 by Adria Carcamo LPN  Outcome: Ongoing  10/9/2021 1527 by Antonia Lenz RN  Outcome: Ongoing  Goal: Maintain normal serum potassium, sodium, calcium, phosphorus, and pH  10/10/2021 0325 by Adria Carcamo LPN  Outcome: Ongoing  10/9/2021 1527 by Antonia Lenz RN  Outcome: Ongoing     Problem: Loneliness or Risk for Loneliness  Goal: Demonstrate positive use of time alone when socialization is not possible  10/10/2021 0325 by Adria Carcamo LPN  Outcome: Ongoing  10/9/2021 1527 by Antonia Lenz RN  Outcome: Ongoing     Problem: Fatigue  Goal: Verbalize increase energy and improved vitality  10/10/2021 0325 by Adria Carcamo LPN  Outcome: Ongoing  10/9/2021 1527 by Antonia Lenz RN  Outcome: Ongoing     Problem: Patient Education: Go to Patient Education Activity  Goal: Patient/Family Education  10/10/2021 0325 by Adria Carcamo LPN  Outcome: Ongoing  10/9/2021 1527 by Antonia Lenz RN  Outcome: Ongoing     Problem: Skin Integrity:  Goal: Will show no infection signs and symptoms  Description: Will show no infection signs and symptoms  10/10/2021 0325 by Adria Carcamo LPN  Outcome: Ongoing  10/9/2021 1527 by Antonia Lenz RN  Outcome: Ongoing  Goal: Absence of new skin breakdown  Description: Absence of new skin breakdown  10/10/2021 0325 by Adria Carcamo LPN  Outcome: Ongoing  10/9/2021 1527 by Antonia Lenz RN  Outcome: Ongoing     Problem: Falls - Risk of:  Goal: Will remain free from falls  Description: Will remain free from falls  10/10/2021 0325 by Adria Carcamo LPN  Outcome: Ongoing  10/9/2021 1527 by Antonia Lenz RN  Outcome: Ongoing  Goal: Absence of physical injury  Description: Absence of physical injury  10/10/2021 0325 by Adria Carcamo LPN  Outcome: Ongoing  10/9/2021 1527 by Antonia Lenz RN  Outcome: Ongoing     Problem: Pain:  Goal: Pain level will decrease  Description: Pain level will decrease  10/10/2021 0325 by Aldo Brunner, LPN  Outcome: Ongoing  10/9/2021 1527 by Yuridia Sloan RN  Outcome: Ongoing  Goal: Control of acute pain  Description: Control of acute pain  10/10/2021 0325 by Aldo Brunner, LPN  Outcome: Ongoing  10/9/2021 1527 by Yuridia Sloan RN  Outcome: Ongoing  Goal: Control of chronic pain  Description: Control of chronic pain  10/10/2021 0325 by Aldo Brunner, LPN  Outcome: Ongoing  10/9/2021 1527 by Yuridia Sloan RN  Outcome: Ongoing     Problem: Fluid Volume:  Goal: Ability to achieve a balanced intake and output will improve  Description: Ability to achieve a balanced intake and output will improve  Outcome: Ongoing     Problem: Physical Regulation:  Goal: Ability to maintain clinical measurements within normal limits will improve  Description: Ability to maintain clinical measurements within normal limits will improve  Outcome: Ongoing  Goal: Will show no signs and symptoms of electrolyte imbalance  Description: Will show no signs and symptoms of electrolyte imbalance  Outcome: Ongoing